# Patient Record
Sex: FEMALE | Race: WHITE | NOT HISPANIC OR LATINO | Employment: FULL TIME | URBAN - METROPOLITAN AREA
[De-identification: names, ages, dates, MRNs, and addresses within clinical notes are randomized per-mention and may not be internally consistent; named-entity substitution may affect disease eponyms.]

---

## 2017-01-13 ENCOUNTER — PATIENT MESSAGE (OUTPATIENT)
Dept: OBSTETRICS AND GYNECOLOGY | Facility: CLINIC | Age: 37
End: 2017-01-13

## 2017-01-13 RX ORDER — PROMETHAZINE HYDROCHLORIDE 25 MG/1
25 TABLET ORAL EVERY 6 HOURS PRN
Qty: 30 TABLET | Refills: 1 | Status: SHIPPED | OUTPATIENT
Start: 2017-01-13 | End: 2017-04-28

## 2017-02-02 ENCOUNTER — LAB VISIT (OUTPATIENT)
Dept: LAB | Facility: HOSPITAL | Age: 37
End: 2017-02-02
Attending: OBSTETRICS & GYNECOLOGY
Payer: COMMERCIAL

## 2017-02-02 ENCOUNTER — PROCEDURE VISIT (OUTPATIENT)
Dept: OBSTETRICS AND GYNECOLOGY | Facility: CLINIC | Age: 37
End: 2017-02-02
Payer: COMMERCIAL

## 2017-02-02 ENCOUNTER — INITIAL PRENATAL (OUTPATIENT)
Dept: OBSTETRICS AND GYNECOLOGY | Facility: CLINIC | Age: 37
End: 2017-02-02
Payer: COMMERCIAL

## 2017-02-02 ENCOUNTER — TELEPHONE (OUTPATIENT)
Dept: OBSTETRICS AND GYNECOLOGY | Facility: CLINIC | Age: 37
End: 2017-02-02

## 2017-02-02 VITALS
SYSTOLIC BLOOD PRESSURE: 122 MMHG | WEIGHT: 141.13 LBS | BODY MASS INDEX: 28.51 KG/M2 | DIASTOLIC BLOOD PRESSURE: 66 MMHG

## 2017-02-02 DIAGNOSIS — Z3A.11 11 WEEKS GESTATION OF PREGNANCY: ICD-10-CM

## 2017-02-02 DIAGNOSIS — Z3A.10 10 WEEKS GESTATION OF PREGNANCY: Primary | ICD-10-CM

## 2017-02-02 DIAGNOSIS — Z3A.10 10 WEEKS GESTATION OF PREGNANCY: ICD-10-CM

## 2017-02-02 DIAGNOSIS — Z3A.01 LESS THAN 8 WEEKS GESTATION OF PREGNANCY: ICD-10-CM

## 2017-02-02 LAB
ABO + RH BLD: NORMAL
BASOPHILS # BLD AUTO: 0.02 K/UL
BASOPHILS NFR BLD: 0.3 %
BLD GP AB SCN CELLS X3 SERPL QL: NORMAL
DIFFERENTIAL METHOD: NORMAL
EOSINOPHIL # BLD AUTO: 0.1 K/UL
EOSINOPHIL NFR BLD: 1 %
ERYTHROCYTE [DISTWIDTH] IN BLOOD BY AUTOMATED COUNT: 13.4 %
HCT VFR BLD AUTO: 37.4 %
HGB BLD-MCNC: 12.6 G/DL
LYMPHOCYTES # BLD AUTO: 1.8 K/UL
LYMPHOCYTES NFR BLD: 24.7 %
MCH RBC QN AUTO: 27.5 PG
MCHC RBC AUTO-ENTMCNC: 33.7 %
MCV RBC AUTO: 82 FL
MONOCYTES # BLD AUTO: 0.6 K/UL
MONOCYTES NFR BLD: 8.3 %
NEUTROPHILS # BLD AUTO: 4.7 K/UL
NEUTROPHILS NFR BLD: 65.4 %
PLATELET # BLD AUTO: 266 K/UL
PMV BLD AUTO: 10.4 FL
RBC # BLD AUTO: 4.59 M/UL
TSH SERPL DL<=0.005 MIU/L-ACNC: 1.36 UIU/ML
WBC # BLD AUTO: 7.22 K/UL

## 2017-02-02 PROCEDURE — 87086 URINE CULTURE/COLONY COUNT: CPT

## 2017-02-02 PROCEDURE — 86762 RUBELLA ANTIBODY: CPT

## 2017-02-02 PROCEDURE — 99999 PR PBB SHADOW E&M-EST. PATIENT-LVL II: CPT | Mod: PBBFAC,,, | Performed by: OBSTETRICS & GYNECOLOGY

## 2017-02-02 PROCEDURE — 84443 ASSAY THYROID STIM HORMONE: CPT

## 2017-02-02 PROCEDURE — 87340 HEPATITIS B SURFACE AG IA: CPT

## 2017-02-02 PROCEDURE — 76801 OB US < 14 WKS SINGLE FETUS: CPT | Mod: 26,S$GLB,, | Performed by: PEDIATRICS

## 2017-02-02 PROCEDURE — 90471 IMMUNIZATION ADMIN: CPT | Mod: S$GLB,,, | Performed by: OBSTETRICS & GYNECOLOGY

## 2017-02-02 PROCEDURE — 90686 IIV4 VACC NO PRSV 0.5 ML IM: CPT | Mod: S$GLB,,, | Performed by: OBSTETRICS & GYNECOLOGY

## 2017-02-02 PROCEDURE — 86850 RBC ANTIBODY SCREEN: CPT

## 2017-02-02 PROCEDURE — 0502F SUBSEQUENT PRENATAL CARE: CPT | Mod: S$GLB,,, | Performed by: OBSTETRICS & GYNECOLOGY

## 2017-02-02 PROCEDURE — 86592 SYPHILIS TEST NON-TREP QUAL: CPT

## 2017-02-02 PROCEDURE — 85025 COMPLETE CBC W/AUTO DIFF WBC: CPT

## 2017-02-02 PROCEDURE — 86703 HIV-1/HIV-2 1 RESULT ANTBDY: CPT

## 2017-02-02 PROCEDURE — 86900 BLOOD TYPING SEROLOGIC ABO: CPT

## 2017-02-02 PROCEDURE — 87591 N.GONORRHOEAE DNA AMP PROB: CPT

## 2017-02-02 NOTE — MR AVS SNAPSHOT
[facility]  [address]  [place] [address]  Phone: [phone]  Fax: [phone]                  Enedina Samano   2017 10:30 AM   Procedure visit    Description:  Female : 1980   Provider:  [facility]   Department:  [facility]           Diagnoses this Visit        Comments    Less than 8 weeks gestation of pregnancy                To Do List           Future Appointments        Provider Department Dept Phone    3/3/2017 8:45 AM Vandana Tabares MD [facility] [phone]    3/30/2017 8:45 AM Vandana Tabares MD [facility] [phone]      Goals (5 Years of Data)     None      [facility]     [facility] -  Assistance  Registered nurses in the [facility] provide clinical advisement, health education, appointment booking, and other advisory services.  Call for this free service at [phone].             Medications           Message regarding Medications     Verify the changes and/or additions to your medication regime listed below are the same as discussed with your clinician today.  If any of these changes or additions are incorrect, please notify your healthcare provider.             Verify that the below list of medications is an accurate representation of the medications you are currently taking.  If none reported, the list may be blank. If incorrect, please contact your healthcare provider. Carry this list with you in case of emergency.           Current Medications     citalopram (CELEXA) 20 MG tablet TK 1 T PO QD    esomeprazole (NEXIUM) 40 MG capsule Take 1 capsule (40 mg total) by mouth before breakfast. Annual appt must be made before additional refills are given.    promethazine (PHENERGAN) 25 MG tablet Take 1 tablet (25 mg total) by mouth every 6 (six) hours as needed for Nausea.           Clinical Reference Information           Prenatal  Vitals     Enc. Date GA Prenatal Vitals Prenatal Pulse Pain Level Urine Albumin/Glucose Edema Presentation Dilation/Effacement/Station    2/2/17 10w4d 122/66 / 64 kg (141 lb 2.2 oz) 10 cm / 151 / Absent   Negative / Negative         Your Vitals Were     Last Period                   11/20/2016           Allergies as of 2/2/2017     No Known Allergies      Immunizations Administered on Date of Encounter - 2/2/2017     Name Date Dose VIS Date Route    influenza - Quadrivalent - PF (ADULT) 2/2/2017 0.5 mL 8/7/2015 Intramuscular      Orders Placed During Today's Visit      Normal Orders This Visit    US OB/GYN Procedure (Viewpoint) - Extended List - Future       Language Assistance Services     ATTENTION: Language assistance services are available, free of charge. Please call [phone].      ATENCIÓN: Si farihala sultana, tiene a hui disposición servicios gratuitos de asistencia lingüística. Llame al [phone].     CHÚ Ý: N?u b?n nói Ti?ng Vi?t, có các d?ch v? h? tr? ngôn ng? mi?n phí dành cho b?n. G?i s? [phone].         [facility] complies with applicable Federal civil rights laws and does not discriminate on the basis of race, color, national origin, age, disability, or sex.

## 2017-02-02 NOTE — MR AVS SNAPSHOT
[facility]  [address]  [place] [address]  Phone: [phone]  Fax: [phone]                  Enedina Samano   2017 10:45 AM   Initial Prenatal    Description:  Female : 1980   Provider:  Vandana Tabares MD   Department:  [facility]           Reason for Visit     Initial Prenatal Visit           Diagnoses this Visit        Comments    10 weeks gestation of pregnancy    -  Primary     11 weeks gestation of pregnancy                To Do List           Future Appointments        Provider Department Dept Phone    3/3/2017 8:45 AM Vandana Tabares MD [facility] [phone]    3/30/2017 8:45 AM Vandana Tabares MD [facility] [phone]      Goals (5 Years of Data)     None      Follow-Up and Disposition     Return in about 1 month (around 3/2/2017).    Follow-up and Disposition History      [facility]     [facility] -  Assistance  Registered nurses in the [facility] On Call Center provide clinical advisement, health education, appointment booking, and other advisory services.  Call for this free service at [phone].             Medications           Message regarding Medications     Verify the changes and/or additions to your medication regime listed below are the same as discussed with your clinician today.  If any of these changes or additions are incorrect, please notify your healthcare provider.             Verify that the below list of medications is an accurate representation of the medications you are currently taking.  If none reported, the list may be blank. If incorrect, please contact your healthcare provider. Carry this list with you in case of emergency.           Current Medications     citalopram (CELEXA) 20 MG tablet TK 1 T PO QD    esomeprazole (NEXIUM) 40 MG capsule Take 1 capsule (40 mg total) by mouth before breakfast. Annual appt must be made before  additional refills are given.    promethazine (PHENERGAN) 25 MG tablet Take 1 tablet (25 mg total) by mouth every 6 (six) hours as needed for Nausea.           Clinical Reference Information           Prenatal Vitals     Enc. Date GA Prenatal Vitals Prenatal Pulse Pain Level Urine Albumin/Glucose Edema Presentation Dilation/Effacement/Station    2/2/17 10w4d 122/66 / 64 kg (141 lb 2.2 oz) 10 cm / 151 / Absent   Negative / Negative         Your Vitals Were     BP Weight Last Period BMI       122/66 64 kg (141 lb 2.2 oz) 11/20/2016 28.51 kg/m2       Allergies as of 2/2/2017     No Known Allergies      Immunizations Administered on Date of Encounter - 2/2/2017     Name Date Dose VIS Date Route    influenza - Quadrivalent - PF (ADULT) 2/2/2017 0.5 mL 8/7/2015 Intramuscular      Orders Placed During Today's Visit      Normal Orders This Visit    C. trachomatis/N. gonorrhoeae by AMP DNA Urine     Influenza - Quadrivalent (3 years & older) (PF)     Urine culture     Future Labs/Procedures Expected by Expires    CBC auto differential  2/2/2017 4/3/2018    Hepatitis B surface antigen  2/2/2017 4/3/2018    HIV-1 and HIV-2 antibodies  2/2/2017 4/3/2018    RPR  2/2/2017 4/3/2018    Rubella antibody, IgG  2/2/2017 4/3/2018    TSH  2/2/2017 4/3/2018    Type & Screen  2/2/2017 4/3/2018      Language Assistance Services     ATTENTION: Language assistance services are available, free of charge. Please call [phone].      ATENCIÓN: Si habla marcelaañol, tiene a hui disposición servicios gratuitos de asistencia lingüística. Llame al [phone].     [facility] Ý: N?u b?n nói Ti?ng Vi?t, có các d?ch v? h? tr? ngôn ng? mi?n phí dành cho b?n. G?i s? [phone].         [facility]'s [facility] complies with applicable Federal civil rights laws and does not discriminate on the basis of race, color, national origin, age, disability, or sex.

## 2017-02-02 NOTE — PROCEDURES
Procedures     History: Age: 36 years. : 2 Para: 1.   Obstetric History: Mode of last delivery:  Section @ 36 weeks.  (1).  ____________________________________________________________________________  Dating:  LMP: 16 EDC: 17 GA by LMP: 10w4d  Current Scan on: 17 EDC: 17 GA by current scan: 10w4d  The calculation of the gestational age by current scan was based on CRL.  ____________________________________________________________________________  First Trimester Scan:  Hull gestation.  Biometry:  CRL 36.5 mm 27th% 10w4d (10w1d to 11w0d)     Fetal heart activity: present. Fetal heart rate: 151 bpm.     Summary of Ultrasound Findings:  Transvaginal US. U/S machine: [id number]. U/S view: good.   Impression: normal intrauterine pregnancy.     ____________________________________________________________________________  Maternal Structures:  Cervix: Cervical length: 36 mm.  Right Ovary: normal.   Right Ovary size: 25 mm x 27 mm x 16 mm. Volume: 5.7 ml.   Left Ovary: normal.   Left Ovary size: 20 mm x 20 mm x 11 mm. Volume: 2.3 ml.  Cul de Sac / Pouch of Kalpesh: no free fluid visible.  ____________________________________________________________________________  Report Summary:  Impression:   Single viable intrauterine pregnancy consistent with LMP dating.  Embryo grossly WNL with normal cardiac activity.    Normal uterus, cervix and adnexae as noted above.    No fluid seen in cul-de-sac.     Recommendations: Suggest repeat scan as you feel clinically indicated.       Thanks once again for allowing us to participate in the care of your patients.  If you have any questions concerning today's consultation feel free to contact me or one of my partners.  We can be reached at [phone] during normal business hours.  If you have a question after normal business hours, please contact Labor and Delivery [phone] and the unit secretary will page our on call physician.

## 2017-02-02 NOTE — PROGRESS NOTES
See ultrasound. OB labs today. Some nausea despite Nexium and Tums, mostly at night. Wants [id number] next week; gave info. Flu shot today. Spotting/cramping precautions.

## 2017-02-03 LAB
BACTERIA UR CULT: NO GROWTH
C TRACH DNA SPEC QL NAA+PROBE: NEGATIVE
HBV SURFACE AG SERPL QL IA: NEGATIVE
HIV 1+2 AB+HIV1 P24 AG SERPL QL IA: NEGATIVE
N GONORRHOEA DNA SPEC QL NAA+PROBE: NEGATIVE
RPR SER QL: NORMAL
RUBV IGG SER-ACNC: 11.1 IU/ML
RUBV IGG SER-IMP: REACTIVE

## 2017-02-09 ENCOUNTER — PATIENT MESSAGE (OUTPATIENT)
Dept: OBSTETRICS AND GYNECOLOGY | Facility: CLINIC | Age: 37
End: 2017-02-09

## 2017-02-14 ENCOUNTER — PATIENT MESSAGE (OUTPATIENT)
Dept: OBSTETRICS AND GYNECOLOGY | Facility: CLINIC | Age: 37
End: 2017-02-14

## 2017-03-03 ENCOUNTER — ROUTINE PRENATAL (OUTPATIENT)
Dept: OBSTETRICS AND GYNECOLOGY | Facility: CLINIC | Age: 37
End: 2017-03-03
Payer: COMMERCIAL

## 2017-03-03 VITALS — WEIGHT: 141 LBS | BODY MASS INDEX: 28.48 KG/M2 | SYSTOLIC BLOOD PRESSURE: 124 MMHG | DIASTOLIC BLOOD PRESSURE: 66 MMHG

## 2017-03-03 DIAGNOSIS — Z34.80 SUPERVISION OF OTHER NORMAL PREGNANCY: Primary | ICD-10-CM

## 2017-03-03 DIAGNOSIS — Z3A.14 14 WEEKS GESTATION OF PREGNANCY: ICD-10-CM

## 2017-03-03 PROCEDURE — 99999 PR PBB SHADOW E&M-EST. PATIENT-LVL II: CPT | Mod: PBBFAC,,, | Performed by: OBSTETRICS & GYNECOLOGY

## 2017-03-03 PROCEDURE — 0502F SUBSEQUENT PRENATAL CARE: CPT | Mod: S$GLB,,, | Performed by: OBSTETRICS & GYNECOLOGY

## 2017-03-03 RX ORDER — BUTALBITAL, ACETAMINOPHEN AND CAFFEINE 50; 325; 40 MG/1; MG/1; MG/1
1 TABLET ORAL EVERY 4 HOURS PRN
Qty: 30 TABLET | Refills: 0 | Status: SHIPPED | OUTPATIENT
Start: 2017-03-03 | End: 2017-04-02

## 2017-03-03 NOTE — MR AVS SNAPSHOT
[facility]  [address] [facility]  [place] [address]  Phone: [phone]  Fax: [phone]                  Enedina Samano   3/3/2017 8:45 AM   Routine Prenatal    Description:  Female : 1980   Provider:  Vandana Tabares MD   Department:  [facility]           Reason for Visit     Routine Prenatal Visit           Diagnoses this Visit        Comments    Supervision of other normal pregnancy    -  Primary     14 weeks gestation of pregnancy                To Do List           Future Appointments        Provider Department Dept Phone    3/30/2017 8:45 AM Vandana Tabares MD [facility] [phone]    2017 8:45 AM Vandana Tabares MD [facility] [phone]      Goals (5 Years of Data)     None      Follow-Up and Disposition     Return in about 1 month (around 4/3/2017).    Follow-up and Disposition History       These Medications        Disp Refills Start End    butalbital-acetaminophen-caffeine -40 mg (FIORICET, ESGIC) -40 mg per tablet 30 tablet 0 3/3/2017 2017    Take 1 tablet by mouth every 4 (four) hours as needed for Pain. - Oral    Pharmacy: [facility] [address] [place] [address] [address] AT [facility] Ph #: [phone]         [facility] On Call     [facility] On Call Nurse Care Line -  Assistance  Registered nurses in the [facility] provide clinical advisement, health education, appointment booking, and other advisory services.  Call for this free service at [phone].             Medications           Message regarding Medications     Verify the changes and/or additions to your medication regime listed below are the same as discussed with your clinician today.  If any of these changes or additions are incorrect, please notify your healthcare provider.        START taking these NEW medications        Refills     butalbital-acetaminophen-caffeine -40 mg (FIORICET, ESGIC) -40 mg per tablet 0    Sig: Take 1 tablet by mouth every 4 (four) hours as needed for Pain.    Class: Normal    Route: Oral           Verify that the below list of medications is an accurate representation of the medications you are currently taking.  If none reported, the list may be blank. If incorrect, please contact your healthcare provider. Carry this list with you in case of emergency.           Current Medications     citalopram (CELEXA) 20 MG tablet TK 1 T PO QD    esomeprazole (NEXIUM) 40 MG capsule Take 1 capsule (40 mg total) by mouth before breakfast. Annual appt must be made before additional refills are given.    butalbital-acetaminophen-caffeine -40 mg (FIORICET, ESGIC) -40 mg per tablet Take 1 tablet by mouth every 4 (four) hours as needed for Pain.    promethazine (PHENERGAN) 25 MG tablet Take 1 tablet (25 mg total) by mouth every 6 (six) hours as needed for Nausea.           Clinical Reference Information           Prenatal Vitals     Enc. Date GA Prenatal Vitals Prenatal Pulse Pain Level Urine Albumin/Glucose Edema Presentation Dilation/Effacement/Station    3/3/17 14w5d 124/66 / 64 kg (140 lb 15.8 oz) 15 cm / 148 / Absent  0 Negative / Negative None / None / None      2/2/17 10w4d 122/66 / 64 kg (141 lb 2.2 oz) 10 cm / 151 / Absent   Negative / Negative          TWG: -0.05 kg (-1.8 oz)   Pregravid weight: 64 kg (141 lb 1.5 oz)       Your Vitals Were     BP Weight Last Period BMI       124/66 64 kg (140 lb 15.8 oz) 11/20/2016 28.48 kg/m2       Allergies as of 3/3/2017     No Known Allergies      Immunizations Administered on Date of Encounter - 3/3/2017     None      Language Assistance Services     ATTENTION: Language assistance services are available, free of charge. Please call [phone].      ATENCIÓN: Si habla español, tiene a hui disposición servicios gratuitos de asistencia lingüística. Llame al  [phone].     [facility]ES[facility] Ý: N?u b?n nói Ti?ng Vi?t, có các d?ch v? h? tr? ngôn ng? mi?n phí dành cho b?n. G?i s? [phone].         [facility]'s [facility] complies with applicable Federal civil rights laws and does not discriminate on the basis of race, color, national origin, age, disability, or sex.

## 2017-03-03 NOTE — PROGRESS NOTES
C/o sinus issues, takes Claritin daily: try sudafed, sent Rx for fioricet. Has ENT appt tomorrow.

## 2017-03-27 ENCOUNTER — PATIENT MESSAGE (OUTPATIENT)
Dept: OBSTETRICS AND GYNECOLOGY | Facility: CLINIC | Age: 37
End: 2017-03-27

## 2017-03-30 ENCOUNTER — LAB VISIT (OUTPATIENT)
Dept: LAB | Facility: HOSPITAL | Age: 37
End: 2017-03-30
Attending: OBSTETRICS & GYNECOLOGY
Payer: COMMERCIAL

## 2017-03-30 ENCOUNTER — ROUTINE PRENATAL (OUTPATIENT)
Dept: OBSTETRICS AND GYNECOLOGY | Facility: CLINIC | Age: 37
End: 2017-03-30
Payer: COMMERCIAL

## 2017-03-30 VITALS
SYSTOLIC BLOOD PRESSURE: 118 MMHG | BODY MASS INDEX: 28.85 KG/M2 | DIASTOLIC BLOOD PRESSURE: 74 MMHG | WEIGHT: 142.88 LBS

## 2017-03-30 DIAGNOSIS — Z34.80 SUPERVISION OF OTHER NORMAL PREGNANCY: Primary | ICD-10-CM

## 2017-03-30 DIAGNOSIS — Z3A.18 18 WEEKS GESTATION OF PREGNANCY: ICD-10-CM

## 2017-03-30 PROCEDURE — 0502F SUBSEQUENT PRENATAL CARE: CPT | Mod: S$GLB,,, | Performed by: OBSTETRICS & GYNECOLOGY

## 2017-03-30 PROCEDURE — 81511 FTL CGEN ABNOR FOUR ANAL: CPT

## 2017-03-30 PROCEDURE — 99999 PR PBB SHADOW E&M-EST. PATIENT-LVL II: CPT | Mod: PBBFAC,,, | Performed by: OBSTETRICS & GYNECOLOGY

## 2017-03-30 RX ORDER — LEVOCETIRIZINE DIHYDROCHLORIDE 5 MG/1
TABLET, FILM COATED ORAL
Refills: 6 | COMMUNITY
Start: 2017-03-13 | End: 2017-06-30

## 2017-03-30 RX ORDER — CEFDINIR 300 MG/1
CAPSULE ORAL
Refills: 0 | COMMUNITY
Start: 2017-03-13 | End: 2017-04-28

## 2017-03-30 NOTE — MR AVS SNAPSHOT
[facility]  [address]  [place] [address]  Phone: [phone]  Fax: [phone]                  Enedina Samano   3/30/2017 8:45 AM   Routine Prenatal    Description:  Female : 1980   Provider:  Vandana Tabares MD   Department:  [facility]           Reason for Visit     Routine Prenatal Visit           Diagnoses this Visit        Comments    Supervision of other normal pregnancy    -  Primary     18 weeks gestation of pregnancy                To Do List           Future Appointments        Provider Department Dept Phone    2017 8:45 AM Vandana Tabares MD [facility] [phone]      Goals (5 Years of Data)     None      Follow-Up and Disposition     Return in about 1 month (around 2017).    Follow-up and Disposition History      [facility]     [facility] On Call Nurse Care Line -  Assistance  Unless otherwise directed by your provider, please contact [facility], our nurse care line that is available for  assistance.     Registered nurses in the [facility] On Call Center provide: appointment scheduling, clinical advisement, health education, and other advisory services.  Call: [phone] (toll free)               Medications           Message regarding Medications     Verify the changes and/or additions to your medication regime listed below are the same as discussed with your clinician today.  If any of these changes or additions are incorrect, please notify your healthcare provider.             Verify that the below list of medications is an accurate representation of the medications you are currently taking.  If none reported, the list may be blank. If incorrect, please contact your healthcare provider. Carry this list with you in case of emergency.           Current Medications     butalbital-acetaminophen-caffeine -40 mg (FIORICET, ESGIC) -40 mg per tablet Take 1 tablet by mouth every 4  (four) hours as needed for Pain.    cefdinir (OMNICEF) 300 MG capsule TK ONE C PO  BID    citalopram (CELEXA) 20 MG tablet TK 1 T PO QD    esomeprazole (NEXIUM) 40 MG capsule Take 1 capsule (40 mg total) by mouth before breakfast. Annual appt must be made before additional refills are given.    levocetirizine (XYZAL) 5 MG tablet TK 1 T PO QAM    promethazine (PHENERGAN) 25 MG tablet Take 1 tablet (25 mg total) by mouth every 6 (six) hours as needed for Nausea.           Clinical Reference Information           Prenatal Vitals     Enc. Date GA Prenatal Vitals Prenatal Pulse Pain Level Urine Albumin/Glucose Edema Presentation Dilation/Effacement/Station    3/30/17 18w4d 118/74 / 64.8 kg (142 lb 13.7 oz) 18 cm / 145 / Absent  0 Negative / Negative None / None / None      3/3/17 14w5d 124/66 / 64 kg (140 lb 15.8 oz) 15 cm / 148 / Absent  0 Negative / Negative None / None / None      17 10w4d 122/66 / 64 kg (141 lb 2.2 oz) 10 cm / 151 / Absent   Negative / Negative          TW.8 kg (1 lb 12.2 oz)   Pregravid weight: 64 kg (141 lb 1.5 oz)       Your Vitals Were     BP Weight Last Period BMI       118/74 64.8 kg (142 lb 13.7 oz) 2016 28.85 kg/m2       Allergies as of 3/30/2017     No Known Allergies      Immunizations Administered on Date of Encounter - 3/30/2017     None      Orders Placed During Today's Visit     Future Labs/Procedures Expected by Expires    Maternal Quad Screen  3/30/2017 2018    [facility]  As directed 3/30/2018      Language Assistance Services     ATTENTION: Language assistance services are available, free of charge. Please call [phone].      ATENCIÓN: Si habla marcelaañol, tiene a hui disposición servicios gratuitos de asistencia lingüística. Llame al [phone].     CHÚ Ý: N?u b?n nói Ti?ng Vi?t, có các d?ch v? h? tr? ngôn ng? mi?n phí dành cho b?n. G?i s? [phone].         [facility] complies with applicable Federal civil rights  laws and does not discriminate on the basis of race, color, national origin, age, disability, or sex.

## 2017-04-03 LAB
ALPHA FETOPROTEIN MATERNAL: 74.6 NG/ML
DOWN RISK (<1:270): NORMAL
ETHNIC ORIGIN: NORMAL
GA METHOD: NORMAL
GESTATIONAL AGE (DAYS): 4
GESTATIONAL AGE (WEEKS): 18
HUMAN CHORIONIC GONADOTROPIN: 10 IU/ML
INHIBIN A: 139.9 PG/ML
INSULIN DEPEND. DIABETES: NORMAL
M.O.M. ALPHA FETOPROTEIN: 1.58
M.O.M. HCG: 0.44
M.O.M. INHIBIN A: 0.79
M.O.M. UNCONJ. ESTRIOL: 1.23
MATERNAL AGE AT EDD (YRS): 34
MATERNAL AGE FOR DOWN: NORMAL
MATERNAL WEIGHT (LBS): 143
MULTIPLE GESTATIONS: NORMAL
QUAD SCREEN INTERPRETATION: NORMAL
QUAD SCREEN: NEGATIVE
TRISOMY 18 (<1:100): NORMAL
UNCONJUGATED ESTRIOL: 1.71 NG/ML

## 2017-04-10 ENCOUNTER — OFFICE VISIT (OUTPATIENT)
Dept: MATERNAL FETAL MEDICINE | Facility: CLINIC | Age: 37
End: 2017-04-10
Attending: OBSTETRICS & GYNECOLOGY
Payer: COMMERCIAL

## 2017-04-10 DIAGNOSIS — Z36.89 ENCOUNTER FOR FETAL ANATOMIC SURVEY: ICD-10-CM

## 2017-04-10 DIAGNOSIS — Z34.80 SUPERVISION OF OTHER NORMAL PREGNANCY: ICD-10-CM

## 2017-04-10 DIAGNOSIS — Z3A.18 18 WEEKS GESTATION OF PREGNANCY: ICD-10-CM

## 2017-04-10 PROCEDURE — 76805 OB US >/= 14 WKS SNGL FETUS: CPT | Mod: S$GLB,,, | Performed by: OBSTETRICS & GYNECOLOGY

## 2017-04-10 PROCEDURE — 99499 UNLISTED E&M SERVICE: CPT | Mod: S$GLB,,, | Performed by: OBSTETRICS & GYNECOLOGY

## 2017-04-10 NOTE — LETTER
April 10, 2017      Vandana Tabares MD  [address] [facility]  [address]  [facility] [address]           [facility]  [address]  [facility] [address]  Phone: [phone]          Patient: Enedina Samano   MR Number: [id number]   YOB: 1980   Date of Visit: 4/10/2017       Dear Dr. Vandana Tabares:    Thank you for referring Enedina Samano to me for evaluation. Attached you will find relevant portions of my assessment and plan of care.    If you have questions, please do not hesitate to call me. I look forward to following Enedina Samano along with you.    Sincerely,    Néstor Shabazz MD    Enclosure  CC:  No Recipients    If you would like to receive this communication electronically, please contact [email or web address] or [phone] to request more information on [employer] Link access.    For providers and/or their staff who would like to refer a patient to [facility], please contact us through our one-stop-shop provider referral line, [facility], at [phone].    If you feel you have received this communication in error or would no longer like to receive these types of communications, please e-mail [email or web address]

## 2017-04-10 NOTE — PROGRESS NOTES
Indication  ========    Fetal anatomy survey.    Pregnancy History  ==============    Maternal Lab Tests  Test: Cell Free DNA Testing  Result: MaterniT 21 Negative  Test: Quad/ Penta Screen  Result: Negative  Wants to know gender: yes    Method  ======    Transabdominal ultrasound examination. View: Good view.    Pregnancy  =========    Hull pregnancy. Number of fetuses: 1.    Dating  ======    LMP on: 11/20/2016  Cycle: regular cycle  GA by LMP 20 w + 1 d  MARCIA by LMP: 8/27/2017  Ultrasound examination on: 4/10/2017  GA by U/S based upon: AC, BPD, Femur, HC  GA by U/S 20 w + 2 d  MARCIA by U/S: 8/26/2017  Assigned GA 20 w + 1 d  Assigned MARCIA: 8/27/2017    General Evaluation  ==============    Cardiac activity: present.  bpm.  Fetal movements: visualized.  Presentation: cephalic.  Placenta:  Placental site: posterior.  Umbilical cord: Cord vessels: 3 vessel cord.  Amniotic fluid: Amount of AF: normal amount.    Fetal Biometry  ============    Fetal Biometry  BPD 49.1 mm 78% 20w 6d Hadlock  OFD 61.4 mm 82% 21w 1d [place]  .7 mm 50% 20w 2d Hadlock  .3 mm 40% 20w 0d Hadlock  Femur 31.9 mm 34% 19w 6d Hadlock  Cerebellum tr 20.6 mm 58% 20w 3d [place]  CM 4.1 mm 20% Nicolaides  Nuchal fold 3.69 mm  Humerus 32.6 mm 81% 21w 0d [place]   g 39% 20w 0d Hadlock  Calculated by: Hadlock (BPD-HC-AC-FL)  EFW (lb) 0 lb  EFW (oz) 12 oz  Cephalic index 0.80 63% Nicolaides  HC / AC 1.21 82% Hadlock  FL / BPD 0.65 9% Hadlock  FL / AC 0.22 45% Hadlock   bpm  Head / Face / Neck   4.9 mm  Nasal bone 7.1 mm      Maternal Structures  ===============    Uterus / Cervix  Uterus: Normal  Cervical length 38.1 mm  Ovaries / Tubes / Adnexa  Rt ovary: Visualized  Lt ovary: Not visualized  Pouch of Kalpesh / Other Structures  Cul de Sac: Visualized  Free fluid: No free fluid visualized    Impression  =========    Normal fetal anatomy with no obvious abnormalities  Normal fetal growth  Normal amniotic fluid  volume  Normal placental location  Normal cervical length.    Recommendation  ==============    Repeat ultrasound study as clinically indicated.

## 2017-04-28 ENCOUNTER — ROUTINE PRENATAL (OUTPATIENT)
Dept: OBSTETRICS AND GYNECOLOGY | Facility: CLINIC | Age: 37
End: 2017-04-28
Payer: COMMERCIAL

## 2017-04-28 VITALS — DIASTOLIC BLOOD PRESSURE: 72 MMHG | WEIGHT: 145.5 LBS | SYSTOLIC BLOOD PRESSURE: 122 MMHG | BODY MASS INDEX: 29.39 KG/M2

## 2017-04-28 DIAGNOSIS — Z34.80 SUPERVISION OF OTHER NORMAL PREGNANCY: Primary | ICD-10-CM

## 2017-04-28 DIAGNOSIS — Z3A.22 22 WEEKS GESTATION OF PREGNANCY: ICD-10-CM

## 2017-04-28 PROCEDURE — 0502F SUBSEQUENT PRENATAL CARE: CPT | Mod: S$GLB,,, | Performed by: OBSTETRICS & GYNECOLOGY

## 2017-04-28 PROCEDURE — 99999 PR PBB SHADOW E&M-EST. PATIENT-LVL II: CPT | Mod: PBBFAC,,, | Performed by: OBSTETRICS & GYNECOLOGY

## 2017-04-28 NOTE — MR AVS SNAPSHOT
[facility]  [address]  [place] [address]  Phone: [phone]  Fax: [phone]                  Enedina Samano   2017 8:45 AM   Routine Prenatal    Description:  Female : 1980   Provider:  Vandana Tabares MD   Department:  [facility]           Reason for Visit     Routine Prenatal Visit           Diagnoses this Visit        Comments    Supervision of other normal pregnancy    -  Primary     22 weeks gestation of pregnancy                To Do List           Future Appointments        Provider Department Dept Phone    2017 11:15 AM Vandana Tabares MD [facility] [phone]    2017 9:15 AM Vandana Tabares MD [facility] [phone]    2017 9:15 AM Vandana Tabares MD [facility] [phone]    2017 9:15 AM Vandana Tabares MD [facility] [phone]    2017 9:15 AM Vandana Tabares MD [facility] [phone]      Goals (5 Years of Data)     None      Follow-Up and Disposition     Return in about 1 month (around 2017).    Follow-up and Disposition History      [facility] On Call     [facility] On Call Nurse Care Line -  Assistance  Unless otherwise directed by your provider, please contact [facility] On-Call, our nurse care line that is available for  assistance.     Registered nurses in the [facility] On Call Center provide: appointment scheduling, clinical advisement, health education, and other advisory services.  Call: [phone] (toll free)               Medications           Message regarding Medications     Verify the changes and/or additions to your medication regime listed below are the same as discussed with your clinician today.  If any of these changes or additions are incorrect, please notify your healthcare provider.        STOP taking these medications     cefdinir (OMNICEF) 300 MG capsule TK ONE C PO  BID     promethazine (PHENERGAN) 25 MG tablet Take 1 tablet (25 mg total) by mouth every 6 (six) hours as needed for Nausea.           Verify that the below list of medications is an accurate representation of the medications you are currently taking.  If none reported, the list may be blank. If incorrect, please contact your healthcare provider. Carry this list with you in case of emergency.           Current Medications     citalopram (CELEXA) 20 MG tablet TK 1 T PO QD    esomeprazole (NEXIUM) 40 MG capsule Take 1 capsule (40 mg total) by mouth before breakfast. Annual appt must be made before additional refills are given.    levocetirizine (XYZAL) 5 MG tablet TK 1 T PO QAM           Clinical Reference Information           Prenatal Vitals     Enc. Date GA Prenatal Vitals Prenatal Pulse Pain Level Urine Albumin/Glucose Edema Presentation Dilation/Effacement/Station    17 22w5d 122/72 / 66 kg (145 lb 8.1 oz) 22 cm / 143 / Present  0 Negative / Negative None / None / None / No      3/30/17 18w4d 118/74 / 64.8 kg (142 lb 13.7 oz) 18 cm / 145 / Absent  0 Negative / Negative None / None / None      3/3/17 14w5d 124/66 / 64 kg (140 lb 15.8 oz) 15 cm / 148 / Absent  0 Negative / Negative None / None / None      17 10w4d 122/66 / 64 kg (141 lb 2.2 oz) 10 cm / 151 / Absent   Negative / Negative          TW kg (4 lb 6.6 oz)   Pregravid weight: 64 kg (141 lb 1.5 oz)       Your Vitals Were     BP Weight Last Period BMI       122/72 66 kg (145 lb 8.1 oz) 2016 29.39 kg/m2       Allergies as of 2017     No Known Allergies      Immunizations Administered on Date of Encounter - 2017     None      Language Assistance Services     ATTENTION: Language assistance services are available, free of charge. Please call [phone].      ATENCIÓN: Si habla marcelaañol, tiene a hui disposición servicios gratuitos de asistencia lingüística. Llame al [phone].     CHÚ Ý: N?u b?n nói Ti?ng Vi?t, có các d?ch v? h? tr?  dre hernandez? mi?n phí dành cho b?n. G?i s? [phone].         [facility] complies with applicable Federal civil rights laws and does not discriminate on the basis of race, color, national origin, age, disability, or sex.

## 2017-05-08 ENCOUNTER — PATIENT MESSAGE (OUTPATIENT)
Dept: OBSTETRICS AND GYNECOLOGY | Facility: CLINIC | Age: 37
End: 2017-05-08

## 2017-05-11 ENCOUNTER — PATIENT MESSAGE (OUTPATIENT)
Dept: OBSTETRICS AND GYNECOLOGY | Facility: CLINIC | Age: 37
End: 2017-05-11

## 2017-05-25 ENCOUNTER — ROUTINE PRENATAL (OUTPATIENT)
Dept: OBSTETRICS AND GYNECOLOGY | Facility: CLINIC | Age: 37
End: 2017-05-25
Payer: COMMERCIAL

## 2017-05-25 VITALS
WEIGHT: 147.63 LBS | SYSTOLIC BLOOD PRESSURE: 108 MMHG | BODY MASS INDEX: 29.81 KG/M2 | DIASTOLIC BLOOD PRESSURE: 62 MMHG

## 2017-05-25 DIAGNOSIS — Z3A.26 26 WEEKS GESTATION OF PREGNANCY: Primary | ICD-10-CM

## 2017-05-25 DIAGNOSIS — Z34.80 SUPERVISION OF OTHER NORMAL PREGNANCY: ICD-10-CM

## 2017-05-25 PROCEDURE — 99999 PR PBB SHADOW E&M-EST. PATIENT-LVL II: CPT | Mod: PBBFAC,,, | Performed by: OBSTETRICS & GYNECOLOGY

## 2017-05-25 PROCEDURE — 0502F SUBSEQUENT PRENATAL CARE: CPT | Mod: S$GLB,,, | Performed by: OBSTETRICS & GYNECOLOGY

## 2017-05-25 RX ORDER — CYCLOBENZAPRINE HCL 10 MG
10 TABLET ORAL 3 TIMES DAILY PRN
Qty: 30 TABLET | Refills: 0 | Status: SHIPPED | OUTPATIENT
Start: 2017-05-25 | End: 2017-06-04

## 2017-05-25 NOTE — PROGRESS NOTES
C/o low back pain, took Flexeril with last pregnancy and did well; requesting Rx. 7 month labs and Tdap at next visit.

## 2017-06-09 ENCOUNTER — TELEPHONE (OUTPATIENT)
Dept: OBSTETRICS AND GYNECOLOGY | Facility: CLINIC | Age: 37
End: 2017-06-09

## 2017-06-09 ENCOUNTER — NURSE TRIAGE (OUTPATIENT)
Dept: ADMINISTRATIVE | Facility: CLINIC | Age: 37
End: 2017-06-09

## 2017-06-09 ENCOUNTER — HOSPITAL ENCOUNTER (EMERGENCY)
Facility: OTHER | Age: 37
Discharge: HOME | End: 2017-06-09
Attending: OBSTETRICS & GYNECOLOGY
Payer: COMMERCIAL

## 2017-06-09 VITALS
BODY MASS INDEX: 29.64 KG/M2 | TEMPERATURE: 98 F | HEIGHT: 59 IN | WEIGHT: 147 LBS | OXYGEN SATURATION: 99 % | DIASTOLIC BLOOD PRESSURE: 81 MMHG | RESPIRATION RATE: 18 BRPM | HEART RATE: 67 BPM | SYSTOLIC BLOOD PRESSURE: 122 MMHG

## 2017-06-09 DIAGNOSIS — O47.00 PRETERM UTERINE CONTRACTIONS: Primary | ICD-10-CM

## 2017-06-09 DIAGNOSIS — Z3A.28 28 WEEKS GESTATION OF PREGNANCY: ICD-10-CM

## 2017-06-09 LAB — FIBRONECTIN FETAL SPEC QL: NEGATIVE

## 2017-06-09 PROCEDURE — 99283 EMERGENCY DEPT VISIT LOW MDM: CPT | Mod: 25,,, | Performed by: OBSTETRICS & GYNECOLOGY

## 2017-06-09 PROCEDURE — 59025 FETAL NON-STRESS TEST: CPT | Mod: 26,,, | Performed by: OBSTETRICS & GYNECOLOGY

## 2017-06-09 PROCEDURE — 87081 CULTURE SCREEN ONLY: CPT

## 2017-06-09 PROCEDURE — 25000003 PHARM REV CODE 250: Performed by: OBSTETRICS & GYNECOLOGY

## 2017-06-09 PROCEDURE — 99284 EMERGENCY DEPT VISIT MOD MDM: CPT | Mod: 25

## 2017-06-09 PROCEDURE — 59025 FETAL NON-STRESS TEST: CPT

## 2017-06-09 PROCEDURE — 82731 ASSAY OF FETAL FIBRONECTIN: CPT

## 2017-06-09 RX ADMIN — SODIUM CHLORIDE, SODIUM LACTATE, POTASSIUM CHLORIDE, AND CALCIUM CHLORIDE 1000 ML: .6; .31; .03; .02 INJECTION, SOLUTION INTRAVENOUS at 06:06

## 2017-06-09 NOTE — ED NOTES
VSS. FHTs reassuring. Cervix closed. FFN negative. Urine dip WNL. Pt discharged as ordered.  labor precautions reviewed. Written instructions also provided. Pt instructed to keep appt with Dr. Tabares this morning. Pt ambulated off unit. No signs of distress.

## 2017-06-09 NOTE — TELEPHONE ENCOUNTER
28 5/7  Went to the ER last night with contractions every 5-10 minutes.  She was sent home with hydration instructions and told her this was her new baseline and if they get worse to come in.  She was not given any medication.   Contractions have since subsided.  She is asking when she should go? Recommended if she is concerned she can always go to the [facility] but if she is having more than 6 in an hour to go to the [facility] after drinking a couple bottles of water if they continue.

## 2017-06-09 NOTE — DISCHARGE INSTRUCTIONS
Call OB Clinic:    1. Baby not moving normally  2. Vaginal bleeding  3. Leakage of fluid like your water broke  4. Four or more contractions in one hour

## 2017-06-09 NOTE — ED PROVIDER NOTES
"Encounter Date: 2017       History     Chief Complaint   Patient presents with    Contractions     Review of patient's allergies indicates:  No Known Allergies  Enedina Samano is a 36 y.o. [id number] at 28w5d presents complaining of contractions occurring q5 minutes since 0130 this AM, she rates the contractions as a 5/10. Denies leakage of fluid, has not engaged in recent intercourse. Denies dysuria, or increase urinary frequency.  This IUP is complicated by history of  delivery at 36 weeks ( 2/2 aodil).  Patient denies vaginal bleeding, and LOF.   Fetal Movement: normal.            Past Medical History:   Diagnosis Date    Anxiety disorder     (Nos) started having panic attacks at end of 2012. became much worse in 2012    Depression     2012     Past Surgical History:   Procedure Laterality Date     SECTION       Family History   Problem Relation Age of Onset    Breast cancer Neg Hx     Colon cancer Neg Hx     Ovarian cancer Neg Hx     Diabetes Neg Hx     Hypertension Neg Hx      Social History   Substance Use Topics    Smoking status: Never Smoker    Smokeless tobacco: Not on file    Alcohol use No      Comment: socially     Review of Systems    Physical Exam     Initial Vitals   BP Pulse Resp Temp SpO2   -- -- -- -- --      /81   Pulse 67   Temp 97.5 °F (36.4 °C) (Temporal)   Resp 18   Ht 4' 11" (1.499 m)   Wt 66.7 kg (147 lb)   LMP 2016   SpO2 99%   Breastfeeding? No   BMI 29.69 kg/m²     Physical Exam    Nursing note and vitals reviewed.  Constitutional: She appears well-developed and well-nourished. She is not diaphoretic. No distress.   HENT:   Head: Normocephalic and atraumatic.   Eyes: Conjunctivae and EOM are normal.   Neck: Normal range of motion.   Cardiovascular: Normal rate.   Pulmonary/Chest: No respiratory distress.   Musculoskeletal: Normal range of motion.   Neurological: She is alert and oriented to person, place, and " time.   Skin: Skin is warm and dry.   Psychiatric: She has a normal mood and affect.     OB LABOR EXAM:       Method: Sterile vaginal exam per MD.   Vaginal Bleeding: none present.     Dilatation: 0.   Station: -3.       Comments: NST Interpretation:   130 BPM baseline  Variability: moderate  Accelerations: present  Decelerations: absent  Contractions: q2-3 minutes    Clinical Impression: Reactive Non-Stress Test         ED Course   Procedures  Labs Reviewed   STREP B SCREEN, VAGINAL / RECTAL   FETAL FIBRONECTIN             Medical Decision Making:   History:   Old Medical Records: I decided to obtain old medical records.  Old Records Summarized: records from clinic visits.  Initial Assessment:    labor  ED Management:  Cervical exam on initial check @0545, closed, 50%, soft  1L bolus given  FFN collected - negative, GBS collected  Has follow-up with Dr. Tabares this morning.               Attending Attestation:   Physician Attestation Statement for Resident:  As the supervising MD   Physician Attestation Statement: I have personally seen and examined this patient.   I agree with the above history. -:   As the supervising MD I agree with the above PE.    As the supervising MD I agree with the above treatment, course, plan, and disposition.   -:   NST  I independently reviewed the fetal non-stress test with the following interpretation:  130 BPM baseline  Variability: moderate  Accelerations: present  Decelerations: absent  Contractions: Q 10 min  Category 1    Clinical Interpretation: age appropriate    Patient evaluated and found to be stable, agree with resident's assessment and plan to discharge to home after contractions spaced out, no cervical change was seen. Patient has plan to follow up in the office this pm.  I was personally present during the critical portions of the procedure(s) performed by the resident and was immediately available in the ED to provide services and assistance as needed during  the entire procedure.                    ED Course     Clinical Impression:   The primary encounter diagnosis was  uterine contractions. A diagnosis of 28 weeks gestation of pregnancy was also pertinent to this visit.          Rissa Zee MD  Resident  [phone]       Shraddha Ventura MD  [phone]

## 2017-06-09 NOTE — TELEPHONE ENCOUNTER
"  Reason for Disposition   [1] Abdominal pain AND [2] pregnant > 20 weeks   [1] Having contractions or other symptoms of labor AND [2] < 37 weeks pregnant (i.e., )   Contractions < 10 minutes apart for 1 hour (i.e., 6 or more contractions an hour)    Answer Assessment - Initial Assessment Questions  1. LOCATION: "Where does it hurt?"       Back pain and lower pelvis  2. RADIATION: "Does the pain shoot anywhere else?" (e.g., chest, back)      As noted  3. ONSET: "When did the pain begin?" (Minutes, hours or days ago)       130am   4. ONSET: "Gradual or sudden onset?"      Woke up with cramp on left side  5. PATTERN: "Does the pain come and go, or has it been constant since it started?"       Every 5-10 minutes  6. SEVERITY: "How bad is the pain?" "What does it keep you from doing?"  (e.g., Scale 1-10; mild, moderate, or severe)    - MILD (1-3): doesn't interfere with normal activities, abdomen soft and not tender to touch     - MODERATE (4-7): interferes with normal activities or awakens from sleep, tender to touch     - SEVERE (8-10): excruciating pain, doubled over, unable to do any normal activities      5-6  7. RECURRENT SYMPTOM: "Have you ever had this type of abdominal pain before?" If so, ask: "When was the last time?" and "What happened that time?"         8. CAUSE: "What do you think is causing the abdominal pain?      contractions  9. RELIEVING/AGGRAVATING FACTORS: "What makes it better or worse?" (e.g., antacids, bowel movement, movement)        10. OTHER SYMPTOMS: "Has there been any vaginal bleeding, fever, vomiting, diarrhea, or urine problems?"          11. MARCIA: "What date are you expecting to deliver?"            Protocols used:  ABDOMINAL PAIN - FEMALE-A-,  PREGNANCY - ABDOMINAL PAIN GREATER THAN 20 WEEKS EGA-A-,  PREGNANCY - LABOR - [id number]    "

## 2017-06-12 LAB — BACTERIA SPEC AEROBE CULT: NORMAL

## 2017-06-18 ENCOUNTER — ANESTHESIA (OUTPATIENT)
Dept: OBSTETRICS AND GYNECOLOGY | Facility: OTHER | Age: 37
End: 2017-06-18
Payer: COMMERCIAL

## 2017-06-18 ENCOUNTER — ANESTHESIA EVENT (OUTPATIENT)
Dept: OBSTETRICS AND GYNECOLOGY | Facility: OTHER | Age: 37
End: 2017-06-18
Payer: COMMERCIAL

## 2017-06-18 ENCOUNTER — HOSPITAL ENCOUNTER (INPATIENT)
Facility: OTHER | Age: 37
LOS: 4 days | Discharge: HOME | End: 2017-06-22
Attending: OBSTETRICS & GYNECOLOGY | Admitting: OBSTETRICS & GYNECOLOGY
Payer: COMMERCIAL

## 2017-06-18 DIAGNOSIS — F41.9 ANXIETY: ICD-10-CM

## 2017-06-18 DIAGNOSIS — O46.93 VAGINAL BLEEDING IN PREGNANCY, THIRD TRIMESTER: ICD-10-CM

## 2017-06-18 DIAGNOSIS — Z3A.30 30 WEEKS GESTATION OF PREGNANCY: ICD-10-CM

## 2017-06-18 DIAGNOSIS — K21.00 GERD WITH ESOPHAGITIS: ICD-10-CM

## 2017-06-18 PROBLEM — O46.90 VAGINAL BLEEDING IN PREGNANCY: Status: ACTIVE | Noted: 2017-06-18

## 2017-06-18 LAB
ABO + RH BLD: NORMAL
ALLENS TEST: ABNORMAL
APTT BLDCRRT: 26.2 SEC
BASOPHILS # BLD AUTO: 0.01 K/UL
BASOPHILS NFR BLD: 0.1 %
BLD GP AB SCN CELLS X3 SERPL QL: NORMAL
CANDIDA RRNA VAG QL PROBE: NEGATIVE
DIFFERENTIAL METHOD: ABNORMAL
EOSINOPHIL # BLD AUTO: 0 K/UL
EOSINOPHIL NFR BLD: 0 %
ERYTHROCYTE [DISTWIDTH] IN BLOOD BY AUTOMATED COUNT: 13.5 %
FIBRINOGEN PPP-MCNC: 437 MG/DL
G VAGINALIS RRNA GENITAL QL PROBE: NEGATIVE
HCO3 UR-SCNC: 20.1 MMOL/L (ref 24–28)
HCT VFR BLD AUTO: 38.7 %
HGB BLD-MCNC: 13 G/DL
HIV1+2 IGG SERPL QL IA.RAPID: NEGATIVE
INR PPP: 0.8
LYMPHOCYTES # BLD AUTO: 1.2 K/UL
LYMPHOCYTES NFR BLD: 10 %
MCH RBC QN AUTO: 27.1 PG
MCHC RBC AUTO-ENTMCNC: 33.6 %
MCV RBC AUTO: 81 FL
MONOCYTES # BLD AUTO: 0.1 K/UL
MONOCYTES NFR BLD: 0.9 %
NEUTROPHILS # BLD AUTO: 10.7 K/UL
NEUTROPHILS NFR BLD: 88.8 %
PCO2 BLDA: 50.7 MMHG (ref 35–45)
PH SMN: 7.21 [PH] (ref 7.35–7.45)
PLATELET # BLD AUTO: 213 K/UL
PMV BLD AUTO: 10.4 FL
PO2 BLDA: 11 MMHG (ref 80–100)
POC BE: -8 MMOL/L
POC SATURATED O2: 8 % (ref 95–100)
PROTHROMBIN TIME: 9 SEC
RBC # BLD AUTO: 4.79 M/UL
SAMPLE: ABNORMAL
SITE: ABNORMAL
T VAGINALIS RRNA GENITAL QL PROBE: NEGATIVE
WBC # BLD AUTO: 12.06 K/UL

## 2017-06-18 PROCEDURE — 88302 TISSUE EXAM BY PATHOLOGIST: CPT | Mod: 26,,, | Performed by: PATHOLOGY

## 2017-06-18 PROCEDURE — 25000003 PHARM REV CODE 250: Performed by: OBSTETRICS & GYNECOLOGY

## 2017-06-18 PROCEDURE — 37000009 HC ANESTHESIA EA ADD 15 MINS: Performed by: OBSTETRICS & GYNECOLOGY

## 2017-06-18 PROCEDURE — 59025 FETAL NON-STRESS TEST: CPT | Mod: 26,,, | Performed by: OBSTETRICS & GYNECOLOGY

## 2017-06-18 PROCEDURE — 25000003 PHARM REV CODE 250: Performed by: ANESTHESIOLOGY

## 2017-06-18 PROCEDURE — 99285 EMERGENCY DEPT VISIT HI MDM: CPT | Mod: 25

## 2017-06-18 PROCEDURE — 59025 FETAL NON-STRESS TEST: CPT

## 2017-06-18 PROCEDURE — 88307 TISSUE EXAM BY PATHOLOGIST: CPT | Mod: 26,,, | Performed by: PATHOLOGY

## 2017-06-18 PROCEDURE — 85025 COMPLETE CBC W/AUTO DIFF WBC: CPT

## 2017-06-18 PROCEDURE — 86900 BLOOD TYPING SEROLOGIC ABO: CPT

## 2017-06-18 PROCEDURE — 99900035 HC TECH TIME PER 15 MIN (STAT)

## 2017-06-18 PROCEDURE — 51702 INSERT TEMP BLADDER CATH: CPT

## 2017-06-18 PROCEDURE — 63600175 PHARM REV CODE 636 W HCPCS

## 2017-06-18 PROCEDURE — 82803 BLOOD GASES ANY COMBINATION: CPT

## 2017-06-18 PROCEDURE — 76818 FETAL BIOPHYS PROFILE W/NST: CPT | Mod: 26,,, | Performed by: OBSTETRICS & GYNECOLOGY

## 2017-06-18 PROCEDURE — 25000003 PHARM REV CODE 250

## 2017-06-18 PROCEDURE — 85730 THROMBOPLASTIN TIME PARTIAL: CPT

## 2017-06-18 PROCEDURE — 87591 N.GONORRHOEAE DNA AMP PROB: CPT

## 2017-06-18 PROCEDURE — 37000008 HC ANESTHESIA 1ST 15 MINUTES: Performed by: OBSTETRICS & GYNECOLOGY

## 2017-06-18 PROCEDURE — 59514 CESAREAN DELIVERY ONLY: CPT | Mod: ,,, | Performed by: ANESTHESIOLOGY

## 2017-06-18 PROCEDURE — 63600175 PHARM REV CODE 636 W HCPCS: Performed by: OBSTETRICS & GYNECOLOGY

## 2017-06-18 PROCEDURE — 76815 OB US LIMITED FETUS(S): CPT

## 2017-06-18 PROCEDURE — 86850 RBC ANTIBODY SCREEN: CPT

## 2017-06-18 PROCEDURE — 86703 HIV-1/HIV-2 1 RESULT ANTBDY: CPT

## 2017-06-18 PROCEDURE — 36000679 HC C/S TUBAL LIGATION, UNSCHEDULED

## 2017-06-18 PROCEDURE — 87480 CANDIDA DNA DIR PROBE: CPT

## 2017-06-18 PROCEDURE — 85610 PROTHROMBIN TIME: CPT

## 2017-06-18 PROCEDURE — 99253 IP/OBS CNSLTJ NEW/EST LOW 45: CPT | Mod: 25,,, | Performed by: OBSTETRICS & GYNECOLOGY

## 2017-06-18 PROCEDURE — 63600175 PHARM REV CODE 636 W HCPCS: Performed by: ANESTHESIOLOGY

## 2017-06-18 PROCEDURE — 88307 TISSUE EXAM BY PATHOLOGIST: CPT | Performed by: PATHOLOGY

## 2017-06-18 PROCEDURE — 99223 1ST HOSP IP/OBS HIGH 75: CPT | Mod: 25,,, | Performed by: OBSTETRICS & GYNECOLOGY

## 2017-06-18 PROCEDURE — 27200918 HC ALEXIS O RING

## 2017-06-18 PROCEDURE — 25000003 PHARM REV CODE 250: Performed by: STUDENT IN AN ORGANIZED HEALTH CARE EDUCATION/TRAINING PROGRAM

## 2017-06-18 PROCEDURE — 85384 FIBRINOGEN ACTIVITY: CPT

## 2017-06-18 PROCEDURE — 36415 COLL VENOUS BLD VENIPUNCTURE: CPT

## 2017-06-18 PROCEDURE — 11000001 HC ACUTE MED/SURG PRIVATE ROOM

## 2017-06-18 PROCEDURE — 86920 COMPATIBILITY TEST SPIN: CPT

## 2017-06-18 PROCEDURE — 0UB70ZZ EXCISION OF BILATERAL FALLOPIAN TUBES, OPEN APPROACH: ICD-10-PCS | Performed by: OBSTETRICS & GYNECOLOGY

## 2017-06-18 PROCEDURE — 27200691 HC TRAY, EPIDURAL-SINGLE SHOT: Performed by: STUDENT IN AN ORGANIZED HEALTH CARE EDUCATION/TRAINING PROGRAM

## 2017-06-18 PROCEDURE — 63600175 PHARM REV CODE 636 W HCPCS: Performed by: STUDENT IN AN ORGANIZED HEALTH CARE EDUCATION/TRAINING PROGRAM

## 2017-06-18 RX ORDER — FAMOTIDINE 10 MG/ML
20 INJECTION INTRAVENOUS
Status: DISCONTINUED | OUTPATIENT
Start: 2017-06-18 | End: 2017-06-18

## 2017-06-18 RX ORDER — IBUPROFEN 400 MG/1
800 TABLET ORAL EVERY 8 HOURS
Status: DISCONTINUED | OUTPATIENT
Start: 2017-06-20 | End: 2017-06-18

## 2017-06-18 RX ORDER — BISACODYL 10 MG
10 SUPPOSITORY, RECTAL RECTAL ONCE AS NEEDED
Status: ACTIVE | OUTPATIENT
Start: 2017-06-18 | End: 2017-06-18

## 2017-06-18 RX ORDER — KETOROLAC TROMETHAMINE 30 MG/ML
INJECTION, SOLUTION INTRAMUSCULAR; INTRAVENOUS
Status: DISCONTINUED | OUTPATIENT
Start: 2017-06-18 | End: 2017-06-18

## 2017-06-18 RX ORDER — BETAMETHASONE SODIUM PHOSPHATE AND BETAMETHASONE ACETATE 3; 3 MG/ML; MG/ML
12 INJECTION, SUSPENSION INTRA-ARTICULAR; INTRALESIONAL; INTRAMUSCULAR; SOFT TISSUE ONCE
Status: DISCONTINUED | OUTPATIENT
Start: 2017-06-19 | End: 2017-06-18

## 2017-06-18 RX ORDER — BUPIVACAINE HYDROCHLORIDE 7.5 MG/ML
INJECTION, SOLUTION INTRASPINAL
Status: DISCONTINUED | OUTPATIENT
Start: 2017-06-18 | End: 2017-06-18

## 2017-06-18 RX ORDER — NIFEDIPINE 10 MG/1
10 CAPSULE ORAL EVERY 6 HOURS
Status: DISCONTINUED | OUTPATIENT
Start: 2017-06-18 | End: 2017-06-18

## 2017-06-18 RX ORDER — METHYLERGONOVINE MALEATE 0.2 MG/ML
INJECTION INTRAVENOUS
Status: DISCONTINUED
Start: 2017-06-18 | End: 2017-06-18 | Stop reason: WASHOUT

## 2017-06-18 RX ORDER — SIMETHICONE 80 MG
1 TABLET,CHEWABLE ORAL EVERY 6 HOURS PRN
Status: DISCONTINUED | OUTPATIENT
Start: 2017-06-18 | End: 2017-06-22 | Stop reason: HOSPADM

## 2017-06-18 RX ORDER — ONDANSETRON 2 MG/ML
4 INJECTION INTRAMUSCULAR; INTRAVENOUS EVERY 12 HOURS PRN
Status: DISCONTINUED | OUTPATIENT
Start: 2017-06-18 | End: 2017-06-22 | Stop reason: HOSPADM

## 2017-06-18 RX ORDER — MORPHINE SULFATE 0.5 MG/ML
INJECTION, SOLUTION EPIDURAL; INTRATHECAL; INTRAVENOUS
Status: DISCONTINUED | OUTPATIENT
Start: 2017-06-18 | End: 2017-06-18

## 2017-06-18 RX ORDER — DOCUSATE SODIUM 100 MG/1
200 CAPSULE, LIQUID FILLED ORAL 2 TIMES DAILY
Status: DISCONTINUED | OUTPATIENT
Start: 2017-06-18 | End: 2017-06-22 | Stop reason: HOSPADM

## 2017-06-18 RX ORDER — OXYCODONE AND ACETAMINOPHEN 5; 325 MG/1; MG/1
1 TABLET ORAL EVERY 4 HOURS PRN
Status: DISCONTINUED | OUTPATIENT
Start: 2017-06-18 | End: 2017-06-22 | Stop reason: HOSPADM

## 2017-06-18 RX ORDER — ONDANSETRON 8 MG/1
8 TABLET, ORALLY DISINTEGRATING ORAL EVERY 8 HOURS PRN
Status: DISCONTINUED | OUTPATIENT
Start: 2017-06-18 | End: 2017-06-18

## 2017-06-18 RX ORDER — CARBOPROST TROMETHAMINE 250 UG/ML
INJECTION, SOLUTION INTRAMUSCULAR
Status: DISCONTINUED
Start: 2017-06-18 | End: 2017-06-18 | Stop reason: WASHOUT

## 2017-06-18 RX ORDER — SODIUM CITRATE AND CITRIC ACID MONOHYDRATE 334; 500 MG/5ML; MG/5ML
30 SOLUTION ORAL
Status: DISCONTINUED | OUTPATIENT
Start: 2017-06-18 | End: 2017-06-18

## 2017-06-18 RX ORDER — AMOXICILLIN 250 MG
1 CAPSULE ORAL NIGHTLY PRN
Status: DISCONTINUED | OUTPATIENT
Start: 2017-06-18 | End: 2017-06-22 | Stop reason: HOSPADM

## 2017-06-18 RX ORDER — HYDROCORTISONE 25 MG/G
CREAM TOPICAL 3 TIMES DAILY PRN
Status: DISCONTINUED | OUTPATIENT
Start: 2017-06-18 | End: 2017-06-22 | Stop reason: HOSPADM

## 2017-06-18 RX ORDER — OXYTOCIN 10 [USP'U]/ML
INJECTION, SOLUTION INTRAMUSCULAR; INTRAVENOUS
Status: DISCONTINUED
Start: 2017-06-18 | End: 2017-06-18 | Stop reason: WASHOUT

## 2017-06-18 RX ORDER — HYDROCODONE BITARTRATE AND ACETAMINOPHEN 500; 5 MG/1; MG/1
TABLET ORAL
Status: DISCONTINUED | OUTPATIENT
Start: 2017-06-18 | End: 2017-06-22 | Stop reason: HOSPADM

## 2017-06-18 RX ORDER — MAGNESIUM SULFATE HEPTAHYDRATE 40 MG/ML
4 INJECTION, SOLUTION INTRAVENOUS ONCE
Status: COMPLETED | OUTPATIENT
Start: 2017-06-18 | End: 2017-06-18

## 2017-06-18 RX ORDER — CITALOPRAM 10 MG/1
20 TABLET ORAL DAILY
Status: DISCONTINUED | OUTPATIENT
Start: 2017-06-18 | End: 2017-06-22 | Stop reason: HOSPADM

## 2017-06-18 RX ORDER — KETOROLAC TROMETHAMINE 30 MG/ML
30 INJECTION, SOLUTION INTRAMUSCULAR; INTRAVENOUS EVERY 6 HOURS
Status: COMPLETED | OUTPATIENT
Start: 2017-06-18 | End: 2017-06-19

## 2017-06-18 RX ORDER — ACETAMINOPHEN 325 MG/1
650 TABLET ORAL EVERY 6 HOURS
Status: DISCONTINUED | OUTPATIENT
Start: 2017-06-18 | End: 2017-06-18

## 2017-06-18 RX ORDER — KETOROLAC TROMETHAMINE 30 MG/ML
30 INJECTION, SOLUTION INTRAMUSCULAR; INTRAVENOUS EVERY 6 HOURS
Status: DISCONTINUED | OUTPATIENT
Start: 2017-06-18 | End: 2017-06-18

## 2017-06-18 RX ORDER — FAMOTIDINE 10 MG/ML
INJECTION INTRAVENOUS
Status: COMPLETED
Start: 2017-06-18 | End: 2017-06-18

## 2017-06-18 RX ORDER — AMOXICILLIN 250 MG
1 CAPSULE ORAL NIGHTLY PRN
Status: DISCONTINUED | OUTPATIENT
Start: 2017-06-18 | End: 2017-06-18

## 2017-06-18 RX ORDER — ADHESIVE BANDAGE
30 BANDAGE TOPICAL 2 TIMES DAILY PRN
Status: DISCONTINUED | OUTPATIENT
Start: 2017-06-19 | End: 2017-06-22 | Stop reason: HOSPADM

## 2017-06-18 RX ORDER — ACETAMINOPHEN 325 MG/1
650 TABLET ORAL
Status: DISCONTINUED | OUTPATIENT
Start: 2017-06-18 | End: 2017-06-22 | Stop reason: HOSPADM

## 2017-06-18 RX ORDER — MAGNESIUM SULFATE HEPTAHYDRATE 40 MG/ML
2 INJECTION, SOLUTION INTRAVENOUS CONTINUOUS
Status: DISCONTINUED | OUTPATIENT
Start: 2017-06-18 | End: 2017-06-18

## 2017-06-18 RX ORDER — PROMETHAZINE HYDROCHLORIDE 25 MG/1
25 SUPPOSITORY RECTAL EVERY 6 HOURS PRN
Status: DISCONTINUED | OUTPATIENT
Start: 2017-06-18 | End: 2017-06-22 | Stop reason: HOSPADM

## 2017-06-18 RX ORDER — CALCIUM GLUCONATE 98 MG/ML
1 INJECTION, SOLUTION INTRAVENOUS
Status: DISCONTINUED | OUTPATIENT
Start: 2017-06-18 | End: 2017-06-18

## 2017-06-18 RX ORDER — FENTANYL CITRATE 50 UG/ML
INJECTION, SOLUTION INTRAMUSCULAR; INTRAVENOUS
Status: DISCONTINUED | OUTPATIENT
Start: 2017-06-18 | End: 2017-06-18

## 2017-06-18 RX ORDER — OXYCODONE HYDROCHLORIDE 5 MG/1
5 TABLET ORAL EVERY 4 HOURS PRN
Status: DISCONTINUED | OUTPATIENT
Start: 2017-06-18 | End: 2017-06-22 | Stop reason: HOSPADM

## 2017-06-18 RX ORDER — OXYTOCIN 10 [USP'U]/ML
INJECTION, SOLUTION INTRAMUSCULAR; INTRAVENOUS
Status: DISCONTINUED | OUTPATIENT
Start: 2017-06-18 | End: 2017-06-18

## 2017-06-18 RX ORDER — DIPHENHYDRAMINE HCL 25 MG
25 CAPSULE ORAL EVERY 4 HOURS PRN
Status: DISCONTINUED | OUTPATIENT
Start: 2017-06-18 | End: 2017-06-22 | Stop reason: HOSPADM

## 2017-06-18 RX ORDER — OXYCODONE AND ACETAMINOPHEN 10; 325 MG/1; MG/1
1 TABLET ORAL EVERY 4 HOURS PRN
Status: DISCONTINUED | OUTPATIENT
Start: 2017-06-18 | End: 2017-06-22 | Stop reason: HOSPADM

## 2017-06-18 RX ORDER — ACETAMINOPHEN 10 MG/ML
INJECTION, SOLUTION INTRAVENOUS
Status: DISCONTINUED | OUTPATIENT
Start: 2017-06-18 | End: 2017-06-18

## 2017-06-18 RX ORDER — OXYCODONE AND ACETAMINOPHEN 5; 325 MG/1; MG/1
1 TABLET ORAL EVERY 4 HOURS PRN
Status: DISCONTINUED | OUTPATIENT
Start: 2017-06-18 | End: 2017-06-18

## 2017-06-18 RX ORDER — MAGNESIUM SULFATE HEPTAHYDRATE 40 MG/ML
INJECTION, SOLUTION INTRAVENOUS
Status: COMPLETED
Start: 2017-06-18 | End: 2017-06-18

## 2017-06-18 RX ORDER — SIMETHICONE 80 MG
1 TABLET,CHEWABLE ORAL EVERY 6 HOURS PRN
Status: DISCONTINUED | OUTPATIENT
Start: 2017-06-18 | End: 2017-06-18

## 2017-06-18 RX ORDER — IBUPROFEN 400 MG/1
800 TABLET ORAL EVERY 8 HOURS
Status: DISCONTINUED | OUTPATIENT
Start: 2017-06-19 | End: 2017-06-22 | Stop reason: HOSPADM

## 2017-06-18 RX ORDER — ONDANSETRON 2 MG/ML
INJECTION INTRAMUSCULAR; INTRAVENOUS
Status: DISCONTINUED | OUTPATIENT
Start: 2017-06-18 | End: 2017-06-18

## 2017-06-18 RX ORDER — SODIUM CHLORIDE, SODIUM LACTATE, POTASSIUM CHLORIDE, CALCIUM CHLORIDE 600; 310; 30; 20 MG/100ML; MG/100ML; MG/100ML; MG/100ML
INJECTION, SOLUTION INTRAVENOUS CONTINUOUS
Status: DISCONTINUED | OUTPATIENT
Start: 2017-06-18 | End: 2017-06-18

## 2017-06-18 RX ORDER — KETOROLAC TROMETHAMINE 30 MG/ML
30 INJECTION, SOLUTION INTRAMUSCULAR; INTRAVENOUS EVERY 6 HOURS
Status: DISCONTINUED | OUTPATIENT
Start: 2017-06-19 | End: 2017-06-18

## 2017-06-18 RX ORDER — OXYCODONE HYDROCHLORIDE 5 MG/1
10 TABLET ORAL EVERY 4 HOURS PRN
Status: DISCONTINUED | OUTPATIENT
Start: 2017-06-18 | End: 2017-06-22 | Stop reason: HOSPADM

## 2017-06-18 RX ORDER — SODIUM CHLORIDE, SODIUM LACTATE, POTASSIUM CHLORIDE, CALCIUM CHLORIDE 600; 310; 30; 20 MG/100ML; MG/100ML; MG/100ML; MG/100ML
INJECTION, SOLUTION INTRAVENOUS CONTINUOUS
Status: ACTIVE | OUTPATIENT
Start: 2017-06-18 | End: 2017-06-19

## 2017-06-18 RX ORDER — IBUPROFEN 400 MG/1
800 TABLET ORAL EVERY 8 HOURS
Status: DISCONTINUED | OUTPATIENT
Start: 2017-06-19 | End: 2017-06-18

## 2017-06-18 RX ORDER — OXYTOCIN/RINGER'S LACTATE 20/1000 ML
41.65 PLASTIC BAG, INJECTION (ML) INTRAVENOUS CONTINUOUS
Status: ACTIVE | OUTPATIENT
Start: 2017-06-18 | End: 2017-06-19

## 2017-06-18 RX ORDER — PANTOPRAZOLE SODIUM 40 MG/1
40 TABLET, DELAYED RELEASE ORAL DAILY
Status: DISCONTINUED | OUTPATIENT
Start: 2017-06-18 | End: 2017-06-22 | Stop reason: HOSPADM

## 2017-06-18 RX ORDER — MAGNESIUM SULFATE HEPTAHYDRATE 40 MG/ML
2 INJECTION, SOLUTION INTRAVENOUS ONCE
Status: COMPLETED | OUTPATIENT
Start: 2017-06-18 | End: 2017-06-18

## 2017-06-18 RX ORDER — ONDANSETRON 8 MG/1
8 TABLET, ORALLY DISINTEGRATING ORAL EVERY 8 HOURS PRN
Status: DISCONTINUED | OUTPATIENT
Start: 2017-06-18 | End: 2017-06-22 | Stop reason: HOSPADM

## 2017-06-18 RX ORDER — MISOPROSTOL 200 UG/1
800 TABLET ORAL
Status: DISCONTINUED | OUTPATIENT
Start: 2017-06-18 | End: 2017-06-22 | Stop reason: HOSPADM

## 2017-06-18 RX ORDER — PRENATAL WITH FERROUS FUM AND FOLIC ACID 3080; 920; 120; 400; 22; 1.84; 3; 20; 10; 1; 12; 200; 27; 25; 2 [IU]/1; [IU]/1; MG/1; [IU]/1; MG/1; MG/1; MG/1; MG/1; MG/1; MG/1; UG/1; MG/1; MG/1; MG/1; MG/1
1 TABLET ORAL DAILY
Status: DISCONTINUED | OUTPATIENT
Start: 2017-06-18 | End: 2017-06-22 | Stop reason: HOSPADM

## 2017-06-18 RX ORDER — MISOPROSTOL 200 UG/1
TABLET ORAL
Status: DISCONTINUED
Start: 2017-06-18 | End: 2017-06-18 | Stop reason: WASHOUT

## 2017-06-18 RX ADMIN — DEXTROSE 2 G: 50 INJECTION, SOLUTION INTRAVENOUS at 03:06

## 2017-06-18 RX ADMIN — Medication 0.15 MG: at 03:06

## 2017-06-18 RX ADMIN — KETOROLAC TROMETHAMINE 30 MG: 30 INJECTION, SOLUTION INTRAMUSCULAR at 10:06

## 2017-06-18 RX ADMIN — MAGNESIUM SULFATE HEPTAHYDRATE 2 G: 40 INJECTION, SOLUTION INTRAVENOUS at 02:06

## 2017-06-18 RX ADMIN — OXYTOCIN 2 UNITS: 10 INJECTION, SOLUTION INTRAMUSCULAR; INTRAVENOUS at 04:06

## 2017-06-18 RX ADMIN — FAMOTIDINE 20 MG: 10 INJECTION, SOLUTION INTRAVENOUS at 03:06

## 2017-06-18 RX ADMIN — FAMOTIDINE 20 MG: 10 INJECTION INTRAVENOUS at 03:06

## 2017-06-18 RX ADMIN — FENTANYL CITRATE 10 MCG: 50 INJECTION, SOLUTION INTRAMUSCULAR; INTRAVENOUS at 03:06

## 2017-06-18 RX ADMIN — CITALOPRAM HYDROBROMIDE 20 MG: 20 TABLET ORAL at 06:06

## 2017-06-18 RX ADMIN — ACETAMINOPHEN 1000 MG: 10 INJECTION, SOLUTION INTRAVENOUS at 04:06

## 2017-06-18 RX ADMIN — MAGNESIUM SULFATE HEPTAHYDRATE 2 G/HR: 40 INJECTION, SOLUTION INTRAVENOUS at 02:06

## 2017-06-18 RX ADMIN — KETOROLAC TROMETHAMINE 30 MG: 30 INJECTION, SOLUTION INTRAMUSCULAR; INTRAVENOUS at 04:06

## 2017-06-18 RX ADMIN — BUPIVACAINE HYDROCHLORIDE IN DEXTROSE 12 MG: 7.5 INJECTION, SOLUTION SUBARACHNOID at 03:06

## 2017-06-18 RX ADMIN — ACETAMINOPHEN 650 MG: 325 TABLET ORAL at 10:06

## 2017-06-18 RX ADMIN — SODIUM CHLORIDE, SODIUM LACTATE, POTASSIUM CHLORIDE, AND CALCIUM CHLORIDE: .6; .31; .03; .02 INJECTION, SOLUTION INTRAVENOUS at 01:06

## 2017-06-18 RX ADMIN — ONDANSETRON 4 MG: 2 INJECTION INTRAMUSCULAR; INTRAVENOUS at 03:06

## 2017-06-18 RX ADMIN — SODIUM CITRATE AND CITRIC ACID MONOHYDRATE 30 ML: 500; 334 SOLUTION ORAL at 03:06

## 2017-06-18 RX ADMIN — Medication 41.65 MILLI-UNITS/MIN: at 05:06

## 2017-06-18 RX ADMIN — MAGNESIUM SULFATE HEPTAHYDRATE 2 G: 40 INJECTION, SOLUTION INTRAVENOUS at 01:06

## 2017-06-18 RX ADMIN — OXYCODONE HYDROCHLORIDE 10 MG: 5 TABLET ORAL at 06:06

## 2017-06-18 RX ADMIN — SODIUM CHLORIDE, SODIUM LACTATE, POTASSIUM CHLORIDE, AND CALCIUM CHLORIDE: .6; .31; .03; .02 INJECTION, SOLUTION INTRAVENOUS at 10:06

## 2017-06-18 RX ADMIN — DOCUSATE SODIUM 200 MG: 100 CAPSULE, LIQUID FILLED ORAL at 10:06

## 2017-06-18 NOTE — SUBJECTIVE & OBJECTIVE
Obstetric HPI:  Enedina Samano is a 36 y.o. [id number] at 30w0d presents as transfer from [place] for vaginal bleeding. Patient is seen by Dr. Tabares. She states she woke up this AM and noted bleeding in her under garments. She states she went to use the restroom and noted continued bleeding. She immediately went to the hospital in [place] for the bleeding who subsequently transferred her her. She states she has not been wearing pads, is unsure how much bleeding is occurring.  She denies passing clots. She reports the bleeding is bright red. Patient is not currently in pain. She denies recent intercourse or trauma. She states she was told she is андрей, but she has not been feeling contractions. She denies LOF, reports good FM.   Patient was recently seen in [place] for complaints of contractions, q2-3 minutes, FFN negative at this time, patient was discharged home.      Upon transfer, patient has received BMZ x1, 2g amp x1, and procardia 10 mg x1.      This IUP is complicated by anxiety and depression. She has h/o C/S x1 at 37-38 weeks due to arrest of dilation, per patient    Obstetric History       T0      L1     SAB0   TAB0   Ectopic0   Multiple0   Live Births1       # Outcome Date GA Lbr Jose/2nd Weight Sex Delivery Anes PTL Lv   2 Current            1  14 36w0d  2.126 kg (4 lb 11 oz) M CS-Unspec   VENKAT      Name: Max        Past Medical History:   Diagnosis Date    Anxiety disorder     (Nos) started having panic attacks at end of 2012. became much worse in 2012    Depression     2012     Past Surgical History:   Procedure Laterality Date     SECTION         PTA Medications   Medication Sig    citalopram (CELEXA) 20 MG tablet TK 1 T PO QD    esomeprazole (NEXIUM) 40 MG capsule Take 1 capsule (40 mg total) by mouth before breakfast. Annual appt must be made before additional refills are given.    levocetirizine (XYZAL) 5 MG tablet TK 1 T PO QAM        Review of patient's allergies indicates:  No Known Allergies     Family History     None        Social History Main Topics    Smoking status: Never Smoker    Smokeless tobacco: Not on file    Alcohol use No      Comment: socially    Drug use: No    Sexual activity: Yes     Partners: Male     Birth control/ protection: None     Review of Systems   Constitutional: Negative for activity change, appetite change, chills and fever.   Eyes: Negative for visual disturbance.   Respiratory: Negative for shortness of breath.    Cardiovascular: Negative for chest pain and leg swelling.   Gastrointestinal: Negative for abdominal pain, constipation, diarrhea, nausea and vomiting.   Genitourinary: Positive for vaginal bleeding. Negative for pelvic pain, vaginal discharge, vaginal pain and vaginal odor.   Neurological: Negative for seizures and headaches.      Objective:     Vital Signs (Most Recent):  Temp: 97.5 °F (36.4 °C) (06/18/17 1212)  Pulse: 72 (06/18/17 1212)  BP: 127/78 (06/18/17 1200)  SpO2: 100 % (06/18/17 1212) Vital Signs (24h Range):  Temp:  [97.5 °F (36.4 °C)] 97.5 °F (36.4 °C)  Pulse:  [72-75] 72  SpO2:  [100 %] 100 %  BP: (127)/(78) 127/78     Weight: 67.1 kg (148 lb)  Body mass index is 29.89 kg/m².    FHT: 135bpm, Mod BTBV, + accel, occasional late decel, Cat 2  TOCO: contractions Q 2-4 minutes    Physical Exam:   Constitutional: She is oriented to person, place, and time. She appears well-developed and well-nourished. No distress.    HENT:   Head: Normocephalic and atraumatic.    Eyes: Conjunctivae are normal.    Neck: Neck supple.    Cardiovascular: Regular rhythm.     Pulmonary/Chest: Effort normal. No respiratory distress.        Abdominal: Soft. She exhibits no distension. There is no tenderness. There is no rebound and no guarding.     Genitourinary: Pelvic exam was performed with patient supine. There is bleeding (dark red blood clot in vault, no active bleedingVaginal blood present extending  through cervix. Blood is dark and clotted, soaked 3 proctoswabs. No active bleeding when clots removed. No cervical ectropion ) in the vagina. Cervix exhibits no discharge and no friability.               Neurological: She is alert and oriented to person, place, and time.    Skin: Skin is warm and dry. No rash noted. She is not diaphoretic. No erythema.    Psychiatric: She has a normal mood and affect. Her behavior is normal. Judgment and thought content normal.       Cervix:  Dilation:  0  Effacement:  0%  Station: -3  Presentation: Breech     Significant Labs:  Lab Results   Component Value Date    GROUPTRH AB POS 02/02/2017    HEPBSAG Negative 02/02/2017    STREPBCULT No Group B Streptococcus isolated 06/09/2017       I have personallly reviewed all pertinent lab results from the last 24 hours.  Recent Lab Results       06/18/17  1509 06/18/17  1256      Baso # 0.01      Basophil% 0.1      Candida sp  Negative     Differential Method Automated      Eos # 0.0      Eosinophil% 0.0      Gardnerella vaginalis  Negative     Gran # 10.7(H)      Gran% 88.8(H)      Hematocrit 38.7      Hemoglobin 13.0      Lymph # 1.2      Lymph% 10.0(L)      MCH 27.1      MCHC 33.6      MCV 81(L)      Mono # 0.1(L)      Mono% 0.9(L)      MPV 10.4      Platelets 213      RBC 4.79      RDW 13.5      Trichomonas vaginalis  Negative     WBC 12.06

## 2017-06-18 NOTE — Clinical Note
I certify that Inpatient services for greater than or equal to 2 midnights are medically necessary:: Yes   Transfer To (Destination): [facility] ANTEPARTUM [[id number]]   Diagnosis: Vaginal bleeding in pregnancy, third trimester [825170]   Admitting Provider:: PAPA PRIEST [[id number]]   Future Attending Provider: JOSÉ MIGUEL DAVIS [[id number]]   Bed Type Preference: Standard [6]   Reason for IP Medical Treatment  (Clinical interventions that can only be accomplished in the IP setting? ) :: observation in setting of vaginal bleeding   Estimated Length of Stay:: 2 midnights   Plans for Post-Acute care--if anticipated (pick the single best option):: A. No post acute care anticipated at this time   Special Needs:: No Special Needs [1]

## 2017-06-18 NOTE — PROGRESS NOTES
Discussed pt after evaluation with Dr. Maravilla.  US concerning for abruption, 4/10 BPP, recommend movement towards CD.    Fetal status overall reassuring.   Will move towards delivery    Barb H Zeeshan, DO

## 2017-06-18 NOTE — TRANSFER OF CARE
"Anesthesia Transfer of Care Note    Patient: Enedina Samano    Procedure(s) Performed: Procedure(s) (LRB):  DELIVERY- SECTION WITH TUBAL (N/A)    Patient location: PACU    Anesthesia Type: spinal    Transport from OR: Transported from OR on room air with adequate spontaneous ventilation    Post pain: adequate analgesia    Post assessment: no apparent anesthetic complications and tolerated procedure well    Post vital signs: stable    Level of consciousness: awake and alert    Nausea/Vomiting: no nausea/vomiting    Complications: none          Last vitals:   Visit Vitals  /73   Pulse 91   Temp 36.2 °C (97.2 °F)   Resp 16   Ht 4' 11" (1.499 m)   Wt 67.1 kg (148 lb)   LMP 2016   SpO2 99%   Breastfeeding? No   BMI 29.89 kg/m²     "

## 2017-06-18 NOTE — ASSESSMENT & PLAN NOTE
- Patient received BMZ at OSH, continue Mag for fetal neuroprotection  - Do not recommend procardia in setting of vaginal bleeding  - No indication for PCN as patient is GBS negative    Dr. Maravilla to bedside for U/S  - 7 cm hyperechoic area, consistent with possible abruption, located behind posterior placenta  - BPP 4/10, now with late decelerations    Delivery is recommended at this time, discussed with Dr. Byers.

## 2017-06-18 NOTE — ED NOTES
Pt arrived to [facility] via EMS on stretcher, accompanied by family members. Placed in [facility] room 2, Dr.K. Pruitt notified. Urine sample collected. Towel changed under patient, previous towel showed dark red blood, spot was about the size of a tennis ball.

## 2017-06-18 NOTE — H&P
[facility]  Obstetrics  History & Physical    Patient Name: Enedina Samano  MRN: [id number]  Admission Date: 2017  Primary Care Provider: Tj Moss MD    Subjective:     Principal Problem: Vaginal bleeding in pregnancy    History of Present Illness:  Ms. Samano presents with vaginal bleeding.  She was transferred here from [place], where she was vacationing for the weekend. She woke up at 0630 this morning and found that she had bright red bleeding, she then went to the hospital in [place].  She was found to have  contractions while she was there, cervix closed, but she continued to have contractions and vaginal spotting.  She was then transferred here for MFM/admission.     She has had prenatal care with Dr. Tabares. Only complication is previous LTCD    Obstetric HPI:  Patient reports None contractions, active fetal movement, Yes vaginal bleeding , No loss of fluid     This pregnancy has been complicated by previous CD at 37 weeks due to failure to progress    Obstetric History       T0      L1     SAB0   TAB0   Ectopic0   Multiple0   Live Births1       # Outcome Date GA Lbr Jose/2nd Weight Sex Delivery Anes PTL Lv   2 Current            1  14 36w0d  2.126 kg (4 lb 11 oz) M CS-Unspec   VENKAT      Name: Max        Past Medical History:   Diagnosis Date    Anxiety disorder     (Nos) started having panic attacks at end of 2012. became much worse in 2012    Depression     2012     Past Surgical History:   Procedure Laterality Date     SECTION         PTA Medications   Medication Sig    citalopram (CELEXA) 20 MG tablet TK 1 T PO QD    esomeprazole (NEXIUM) 40 MG capsule Take 1 capsule (40 mg total) by mouth before breakfast. Annual appt must be made before additional refills are given.    levocetirizine (XYZAL) 5 MG tablet TK 1 T PO QAM       Review of patient's allergies indicates:  No Known Allergies     Family History      None        Social History Main Topics    Smoking status: Never Smoker    Smokeless tobacco: Not on file    Alcohol use No      Comment: socially    Drug use: No    Sexual activity: Yes     Partners: Male     Birth control/ protection: None     Review of Systems   Constitutional: Negative for activity change, appetite change, chills and fever.   Eyes: Negative for visual disturbance.   Respiratory: Negative for shortness of breath.    Cardiovascular: Negative for chest pain and leg swelling.   Gastrointestinal: Negative for abdominal pain, constipation, diarrhea, nausea and vomiting.   Genitourinary: Positive for vaginal bleeding. Negative for pelvic pain, vaginal discharge, vaginal pain and vaginal odor.   Neurological: Negative for seizures and headaches.      Objective:     Vital Signs (Most Recent):  Temp: 97.5 °F (36.4 °C) (06/18/17 1212)  Pulse: 72 (06/18/17 1212)  BP: 127/78 (06/18/17 1200)  SpO2: 100 % (06/18/17 1212) Vital Signs (24h Range):  Temp:  [97.5 °F (36.4 °C)] 97.5 °F (36.4 °C)  Pulse:  [72-75] 72  SpO2:  [100 %] 100 %  BP: (127)/(78) 127/78     Weight: 67.1 kg (148 lb)  Body mass index is 29.89 kg/m².    FHT: 135 Cat 2 (reassuring)  TOCO: contractions Q 2-4 minutes    Physical Exam:   Constitutional: She is oriented to person, place, and time. She appears well-developed and well-nourished. No distress.    HENT:   Head: Normocephalic and atraumatic.    Eyes: Conjunctivae are normal.    Neck: Neck supple.    Cardiovascular: Regular rhythm.     Pulmonary/Chest: Effort normal. No respiratory distress.        Abdominal: Soft. She exhibits no distension. There is no tenderness. There is no rebound and no guarding.     Genitourinary: Pelvic exam was performed with patient supine. There is bleeding (dark red blood clot in vault, no active bleeding) in the vagina. Cervix exhibits no discharge and no friability.               Neurological: She is alert and oriented to person, place, and time.     Skin: Skin is warm and dry. No rash noted. She is not diaphoretic. No erythema.    Psychiatric: She has a normal mood and affect. Her behavior is normal. Judgment and thought content normal.       Cervix:  Dilation:  0  Effacement:  0%  Station: -3  Presentation: Breech     Significant Labs:  Lab Results   Component Value Date    GROUPTRH AB POS 2017    HEPBSAG Negative 2017    STREPBCULT No Group B Streptococcus isolated 2017       I have personallly reviewed all pertinent lab results from the last 24 hours.  Recent Lab Results     None          Assessment/Plan:     36 y.o. female  at 30w0d for:    30 weeks gestation of pregnancy    Consents for vaginal delivery, C/S, and blood transfusion signed and on chart.  All questions and concerns addressed        GERD with esophagitis    Will restart nexium        Anxiety    Will restart celexa        Vaginal bleeding in pregnancy    Some concern of abruption due to vaginal bleeding and occasional late decel   Will keep her on continuous monitoring, NPO.  BMZ started this AM at 9am.  Will give mag for neuroprotection.   Hold tocolytic per Dr. Jenkins  GBS negative  NICU, MFM and Anesthesia consult ordered            Barb Byers DO  Obstetrics  [facility]

## 2017-06-18 NOTE — CONSULTS
[facility]  Obstetrics & Gynecology  Consult Note    Patient Name: Enedina Samano  MRN: [id number]  Admission Date: 2017  Hospital Length of Stay: 0 days  Attending Physician: Vandana Tabares MD  Primary Care Provider: Tj Moss MD    Inpatient consult to Perinatology  Consult performed by: CAROL PULIDO  Consult ordered by: PAPA PRIEST        [facility]  Obstetrics  Consult Note    Patient Name: Enedina Samano  MRN: [id number]  Admission Date: 2017  Hospital Length of Stay: 0 days  Code Status: Full Code  Primary Care Provider: Tj Moss MD  Principal Problem: <principal problem not specified>    [unfilled]  Subjective:     Principal Problem:<principal problem not specified>    History of Present Illness:  Ms. Samano presents with vaginal bleeding.  She was transferred here from [place], where she was vacationing for the weekend. She woke up at 0630 this morning and found that she had bright red bleeding, she then went to the hospital in [place].  She was found to have  contractions while she was there, cervix closed, but she continued to have contractions and vaginal spotting.  She was then transferred here for MFM/admission.     She has had prenatal care with Dr. Tabares. Only complication is previous LTCD    Obstetric HPI:  Enedina Samano is a 36 y.o. [id number] at 30w0d presents as transfer from [place] for vaginal bleeding. Patient is seen by Dr. Tabares. She states she woke up this AM and noted bleeding in her under garments. She states she went to use the restroom and noted continued bleeding. She immediately went to the hospital in [place] for the bleeding who subsequently transferred her her. She states she has not been wearing pads, is unsure how much bleeding is occurring.  She denies passing clots. She reports the bleeding is bright red. Patient is not currently in pain. She denies recent intercourse or  trauma. She states she was told she is андрей, but she has not been feeling contractions. She denies LOF, reports good FM.   Patient was recently seen in [place] for complaints of contractions, q2-3 minutes, FFN negative at this time, patient was discharged home.      Upon transfer, patient has received BMZ x1, 2g amp x1, and procardia 10 mg x1.      This IUP is complicated by anxiety and depression. She has h/o C/S x1 at 37-38 weeks due to arrest of dilation, per patient    Obstetric History       T0      L1     SAB0   TAB0   Ectopic0   Multiple0   Live Births1       # Outcome Date GA Lbr Jose/2nd Weight Sex Delivery Anes PTL Lv   2 Current            1  14 36w0d  2.126 kg (4 lb 11 oz) M CS-Unspec   VENKAT      Name: Everett        Past Medical History:   Diagnosis Date    Anxiety disorder     (Nos) started having panic attacks at end of 2012. became much worse in 2012    Depression     2012     Past Surgical History:   Procedure Laterality Date     SECTION         PTA Medications   Medication Sig    citalopram (CELEXA) 20 MG tablet TK 1 T PO QD    esomeprazole (NEXIUM) 40 MG capsule Take 1 capsule (40 mg total) by mouth before breakfast. Annual appt must be made before additional refills are given.    levocetirizine (XYZAL) 5 MG tablet TK 1 T PO QAM       Review of patient's allergies indicates:  No Known Allergies     Family History     None        Social History Main Topics    Smoking status: Never Smoker    Smokeless tobacco: Not on file    Alcohol use No      Comment: socially    Drug use: No    Sexual activity: Yes     Partners: Male     Birth control/ protection: None     Review of Systems   Constitutional: Negative for activity change, appetite change, chills and fever.   Eyes: Negative for visual disturbance.   Respiratory: Negative for shortness of breath.    Cardiovascular: Negative for chest pain and leg swelling.   Gastrointestinal: Negative  for abdominal pain, constipation, diarrhea, nausea and vomiting.   Genitourinary: Positive for vaginal bleeding. Negative for pelvic pain, vaginal discharge, vaginal pain and vaginal odor.   Neurological: Negative for seizures and headaches.      Objective:     Vital Signs (Most Recent):  Temp: 97.5 °F (36.4 °C) (06/18/17 1212)  Pulse: 72 (06/18/17 1212)  BP: 127/78 (06/18/17 1200)  SpO2: 100 % (06/18/17 1212) Vital Signs (24h Range):  Temp:  [97.5 °F (36.4 °C)] 97.5 °F (36.4 °C)  Pulse:  [72-75] 72  SpO2:  [100 %] 100 %  BP: (127)/(78) 127/78     Weight: 67.1 kg (148 lb)  Body mass index is 29.89 kg/m².    FHT: 135bpm, Mod BTBV, + accel, occasional late decel, Cat 2  TOCO: contractions Q 2-4 minutes    Physical Exam:   Constitutional: She is oriented to person, place, and time. She appears well-developed and well-nourished. No distress.    HENT:   Head: Normocephalic and atraumatic.    Eyes: Conjunctivae are normal.    Neck: Neck supple.    Cardiovascular: Regular rhythm.     Pulmonary/Chest: Effort normal. No respiratory distress.        Abdominal: Soft. She exhibits no distension. There is no tenderness. There is no rebound and no guarding.     Genitourinary: Pelvic exam was performed with patient supine. There is bleeding (dark red blood clot in vault, no active bleedingVaginal blood present extending through cervix. Blood is dark and clotted, soaked 3 proctoswabs. No active bleeding when clots removed. No cervical ectropion ) in the vagina. Cervix exhibits no discharge and no friability.               Neurological: She is alert and oriented to person, place, and time.    Skin: Skin is warm and dry. No rash noted. She is not diaphoretic. No erythema.    Psychiatric: She has a normal mood and affect. Her behavior is normal. Judgment and thought content normal.       Cervix:  Dilation:  0  Effacement:  0%  Station: -3  Presentation: Breech     Significant Labs:  Lab Results   Component Value Date    [facility]  POS 2017    HEPBSAG Negative 2017    STREPBCULT No Group B Streptococcus isolated 2017       I have personallly reviewed all pertinent lab results from the last 24 hours.  Recent Lab Results       17  1509 17  1256      Baso # 0.01      Basophil% 0.1      Candida sp  Negative     Differential Method Automated      Eos # 0.0      Eosinophil% 0.0      Gardnerella vaginalis  Negative     Gran # 10.7(H)      Gran% 88.8(H)      Hematocrit 38.7      Hemoglobin 13.0      Lymph # 1.2      Lymph% 10.0(L)      MCH 27.1      MCHC 33.6      MCV 81(L)      Mono # 0.1(L)      Mono% 0.9(L)      MPV 10.4      Platelets 213      RBC 4.79      RDW 13.5      Trichomonas vaginalis  Negative     WBC 12.06            Assessment/Plan:     36 y.o. female  at 30w0d for:    Vaginal bleeding in pregnancy    - Patient received BMZ at OSH, continue Mag for fetal neuroprotection  - Do not recommend procardia in setting of vaginal bleeding  - No indication for PCN as patient is GBS negative    Dr. Maravilla to bedside for U/S  - 7 cm hyperechoic area, consistent with possible abruption, located behind posterior placenta  - BPP 4/10, now with late decelerations    Delivery is recommended at this time, discussed with Dr. Byers.               Thank you for your consult.    Angie Pruitt MD  Obstetrics & Gynecology  [facility]      I have seen and examined the patient and agree with the resident note. Recommend delivery. Images reviewed with Dr. Byers at the bedside. Hard copyof images given to Dr. Byers for patient chart. Please also see my full progress note.

## 2017-06-18 NOTE — PROGRESS NOTES
Full consult note to follow.  FHT baseline from 140s to 150s moderate variability, one small period of reactivity with 10x10 accels, 4 late declerations, occ variable  Tocos q2-4min    Limited bedside ultrasound-large chorioamniotic separation at the right upper fundal area with the posterior/fundal placenta appearing to be pulled off the uterine wall, the fluid filled area has portions of mixed echogenicity, likely blood-this area is 7cm.    I compared this area to her prior ultrasounds in our office and this was not seen on prior scans. This is most consistent with a placental abruption particularly given the placental appearance.    BPP 4/10 (no 30 second breathing episode, off for movement and tone as well), 2 points for fluid and brief period of reactivity.    Given the BPP and concern for abruption and decelerations noted on the tracing, I recommend proceeding with csection at this time. I would not delay this. I would not attempt to get BMZ on board. Patient was started on magnesium sulfate for neuroprotection only; no tocolysis.    Labs are pending. Patient is Rh positive.    Discussed with patient and her family and Dr. Byers. We reviewed the limitations of ultrasound in diagnosing abruption. Regardless if an abruption is present or not, the BPP score is indicative of need for delivery and csection would be the recommended route.    Fetus is Breech. No previa noted. BP is normal.  Risks of prematurity reviewed. Risks of continued observation were reviewed.    Would attempt to obtain cord gas and send placenta to path.

## 2017-06-18 NOTE — ANESTHESIA PREPROCEDURE EVALUATION
Enedina Samano is a 36 y.o. female [id number] at 30w0d presents as transfer from [place] for vaginal bleeding and contractions. She has h/o C/S x1 at 37-38 weeks due to arrest of dilation, per patient. Denies problems with anesthesia for this C/S    OB History    Para Term  AB Living   2 1  1  1   SAB TAB Ectopic Multiple Live Births       1      # Outcome Date GA Lbr Jose/2nd Weight Sex Delivery Anes PTL Lv   2 Current            1  14 36w0d  2.126 kg (4 lb 11 oz) M CS-Unspec   VENKAT          Wt Readings from Last 1 Encounters:   17 0527 66.7 kg (147 lb)       BP Readings from Last 3 Encounters:   17 127/78   17 122/81   17 108/62       Patient Active Problem List   Diagnosis    Anxiety disorder    Depression    Vaginal bleeding in pregnancy       Past Surgical History:   Procedure Laterality Date     SECTION         Social History     Social History    Marital status:      Spouse name: N/A    Number of children: N/A    Years of education: N/A     Occupational History    Not on file.     Social History Main Topics    Smoking status: Never Smoker    Smokeless tobacco: Not on file    Alcohol use No      Comment: socially    Drug use: No    Sexual activity: Yes     Partners: Male     Birth control/ protection: None     Other Topics Concern    Not on file     Social History Narrative    No narrative on file         Chemistry        Component Value Date/Time     2016 1124    K 4.0 2016 1124     2016 1124    CO2 23 2016 1124    BUN 10 2016 1124    CREATININE 0.8 2016 1124    GLU 79 2016 1124        Component Value Date/Time    CALCIUM 8.7 2016 1124    ALKPHOS 69 2016 1124    AST 40 2016 1124    ALT 43 2016 1124    BILITOT 0.4 2016 1124            Lab Results   Component Value Date    WBC 7.22 2017    HGB 12.6 2017    HCT 37.4 2017    MCV 82  02/02/2017     02/02/2017       No results for input(s): INR, PROTIME, APTT in the last 72 hours.    Invalid input(s): PT                  Anesthesia Evaluation    I have reviewed the Patient Summary Reports.    I have reviewed the Nursing Notes.   I have reviewed the Medications.     Review of Systems  Anesthesia Hx:  No problems with previous Anesthesia  History of prior surgery of interest to airway management or planning: Denies Family Hx of Anesthesia complications.   Denies Personal Hx of Anesthesia complications.   Hematology/Oncology:  Hematology Normal   Oncology Normal     EENT/Dental:EENT/Dental Normal   Cardiovascular:  Cardiovascular Normal     Pulmonary:  Pulmonary Normal    Renal/:  Renal/ Normal     Hepatic/GI:  Hepatic/GI Normal    Neurological:  Neurology Normal        Physical Exam  General:  Well nourished    Airway/Jaw/Neck:  Airway Findings: Mouth Opening: Normal Tongue: Normal  General Airway Assessment: Adult  Mallampati: III  Improves to II with phonation.  TM Distance: Normal, at least 6 cm      Dental:  Dental Findings: In tact   Chest/Lungs:  Chest/Lungs Findings: Clear to auscultation, Normal Respiratory Rate     Heart/Vascular:  Heart Findings: Rate: Normal  Rhythm: Regular Rhythm  Sounds: Normal  Heart murmur: negative    Abdomen:  Abdomen Findings: Normal           Anesthesia Plan  Type of Anesthesia, risks & benefits discussed:  Anesthesia Type:  epidural, CSE, general, spinal  Patient's Preference:   Intra-op Monitoring Plan: standard ASA monitors  Intra-op Monitoring Plan Comments:   Post Op Pain Control Plan:   Post Op Pain Control Plan Comments:   Induction:   IV  Beta Blocker:  Patient is not currently on a Beta-Blocker (No further documentation required).       Informed Consent: Patient understands risks and agrees with Anesthesia plan.  Questions answered. Anesthesia consent signed with patient.  ASA Score: 2     Day of Surgery Review of History & Physical: I have  interviewed and examined the patient. I have reviewed the patient's H&P dated:            Ready For Surgery From Anesthesia Perspective.

## 2017-06-18 NOTE — NURSING
Mother encouraged to provide breastmilk for her NICU baby. Benefits of breastmilk discussed with mother. Mother declines to breastfeed at this time. Mother encouraged to inform nurse if she changes her mind.

## 2017-06-18 NOTE — ASSESSMENT & PLAN NOTE
Some concern of abruption due to vaginal bleeding and occasional late decel   Will keep her on continuous monitoring, NPO.  BMZ started this AM at 9am.  Will give mag for neuroprotection.   Hold tocolytic per Dr. Jenkins  GBS negative  NICU, MFM and Anesthesia consult ordered

## 2017-06-18 NOTE — HPI
Ms. Samano presents with vaginal bleeding.  She was transferred here from [place], where she was vacationing for the weekend. She woke up at 0630 this morning and found that she had bright red bleeding, she then went to the hospital in [place].  She was found to have  contractions while she was there, cervix closed, but she continued to have contractions and vaginal spotting.  She was then transferred here for MFM/admission.     She has had prenatal care with Dr. Tabares. Only complication is previous LTCD

## 2017-06-18 NOTE — ANESTHESIA PROCEDURE NOTES
Spinal    Diagnosis: IUP  Patient location during procedure: OR  Start time: 6/18/2017 3:43 PM  Timeout: 6/18/2017 3:42 PM  End time: 6/18/2017 3:45 PM  Staffing  Anesthesiologist: ROSEMARY RINCON  Resident/CRNA: STEVE HERBERT  Performed: resident/CRNA   Preanesthetic Checklist  Completed: patient identified, site marked, surgical consent, pre-op evaluation, timeout performed, IV checked, risks and benefits discussed and monitors and equipment checked  Spinal Block  Patient position: sitting  Prep: ChloraPrep  Patient monitoring: heart rate, cardiac monitor and continuous pulse ox  Approach: midline  Location: L3-4  Injection technique: single shot  CSF Fluid: clear free-flowing CSF  Needle  Needle type: pencil-tip   Needle gauge: 25 G  Needle length: 3.5 in  Additional Documentation: incremental injection  Needle localization: anatomical landmarks  Assessment  Sensory level: T5   Dermatomal levels determined by pinch or prick  Ease of block: easy  Patient's tolerance of the procedure: comfortable throughout block  Medications:  Bolus administered: 1.4 mL of 0.75 and with dextrose bupivacaine  Opioid administered: 160 mcg of   fentanyl and morphine

## 2017-06-18 NOTE — ASSESSMENT & PLAN NOTE
Consents for vaginal delivery, C/S, and blood transfusion signed and on chart.  All questions and concerns addressed

## 2017-06-18 NOTE — SUBJECTIVE & OBJECTIVE
Obstetric HPI:  Patient reports None contractions, active fetal movement, Yes vaginal bleeding , No loss of fluid     This pregnancy has been complicated by previous CD at 37 weeks due to failure to progress    Obstetric History       T0      L1     SAB0   TAB0   Ectopic0   Multiple0   Live Births1       # Outcome Date GA Lbr Jose/2nd Weight Sex Delivery Anes PTL Lv   2 Current            1  14 36w0d  2.126 kg (4 lb 11 oz) M CS-Unspec   VENKAT      Name: Max        Past Medical History:   Diagnosis Date    Anxiety disorder     (Nos) started having panic attacks at end of 2012. became much worse in 2012    Depression     2012     Past Surgical History:   Procedure Laterality Date     SECTION         PTA Medications   Medication Sig    citalopram (CELEXA) 20 MG tablet TK 1 T PO QD    esomeprazole (NEXIUM) 40 MG capsule Take 1 capsule (40 mg total) by mouth before breakfast. Annual appt must be made before additional refills are given.    levocetirizine (XYZAL) 5 MG tablet TK 1 T PO QAM       Review of patient's allergies indicates:  No Known Allergies     Family History     None        Social History Main Topics    Smoking status: Never Smoker    Smokeless tobacco: Not on file    Alcohol use No      Comment: socially    Drug use: No    Sexual activity: Yes     Partners: Male     Birth control/ protection: None     Review of Systems   Constitutional: Negative for activity change, appetite change, chills and fever.   Eyes: Negative for visual disturbance.   Respiratory: Negative for shortness of breath.    Cardiovascular: Negative for chest pain and leg swelling.   Gastrointestinal: Negative for abdominal pain, constipation, diarrhea, nausea and vomiting.   Genitourinary: Positive for vaginal bleeding. Negative for pelvic pain, vaginal discharge, vaginal pain and vaginal odor.   Neurological: Negative for seizures and headaches.      Objective:     Vital Signs  (Most Recent):  Temp: 97.5 °F (36.4 °C) (06/18/17 1212)  Pulse: 72 (06/18/17 1212)  BP: 127/78 (06/18/17 1200)  SpO2: 100 % (06/18/17 1212) Vital Signs (24h Range):  Temp:  [97.5 °F (36.4 °C)] 97.5 °F (36.4 °C)  Pulse:  [72-75] 72  SpO2:  [100 %] 100 %  BP: (127)/(78) 127/78     Weight: 67.1 kg (148 lb)  Body mass index is 29.89 kg/m².    FHT: 135 Cat 2 (reassuring)  TOCO: contractions Q 2-4 minutes    Physical Exam:   Constitutional: She is oriented to person, place, and time. She appears well-developed and well-nourished. No distress.    HENT:   Head: Normocephalic and atraumatic.    Eyes: Conjunctivae are normal.    Neck: Neck supple.    Cardiovascular: Regular rhythm.     Pulmonary/Chest: Effort normal. No respiratory distress.        Abdominal: Soft. She exhibits no distension. There is no tenderness. There is no rebound and no guarding.     Genitourinary: Pelvic exam was performed with patient supine. There is bleeding (dark red blood clot in vault, no active bleeding) in the vagina. Cervix exhibits no discharge and no friability.               Neurological: She is alert and oriented to person, place, and time.    Skin: Skin is warm and dry. No rash noted. She is not diaphoretic. No erythema.    Psychiatric: She has a normal mood and affect. Her behavior is normal. Judgment and thought content normal.       Cervix:  Dilation:  0  Effacement:  0%  Station: -3  Presentation: Breech     Significant Labs:  Lab Results   Component Value Date    GROUPTRH AB POS 02/02/2017    HEPBSAG Negative 02/02/2017    STREPBCULT No Group B Streptococcus isolated 06/09/2017       I have personallly reviewed all pertinent lab results from the last 24 hours.  Recent Lab Results     None

## 2017-06-18 NOTE — ED PROVIDER NOTES
Encounter Date: 2017       History     Chief Complaint   Patient presents with    Vaginal Bleeding    Contractions     Review of patient's allergies indicates:  No Known Allergies  Enedina Samano is a 36 y.o. [id number] at 30w0d presents as transfer from [place] for vaginal bleeding. Patient is seen by Dr. Tabares. She states she woke up this AM and noted bleeding in her under garments. She states she went to use the restroom and noted continued bleeding. She immediately went to the hospital in [place] for the bleeding who subsequently transferred her her. She states she has not been wearing pads, is unsure how much bleeding is occurring.  She denies passing clots. She reports the bleeding is bright red. Patient is not currently in pain. She denies recent intercourse or trauma. She states she was told she is андрей, but she has not been feeling contractions. She denies LOF, reports good FM.   Patient was recently seen in [place] for complaints of contractions, q2-3 minutes, FFN negative at this time, patient was discharged home.     Upon transfer, patient has received BMZ x1, 2g amp x1, and procardia 10 mg x1.     This IUP is complicated by anxiety and depression. She has h/o C/S x1 at 37-38 weeks due to arrest of dilation, per patient          Past Medical History:   Diagnosis Date    Anxiety disorder     (Nos) started having panic attacks at end of 2012. became much worse in 2012    Depression     2012     Past Surgical History:   Procedure Laterality Date     SECTION       Family History   Problem Relation Age of Onset    Breast cancer Neg Hx     Colon cancer Neg Hx     Ovarian cancer Neg Hx     Diabetes Neg Hx     Hypertension Neg Hx      Social History   Substance Use Topics    Smoking status: Never Smoker    Smokeless tobacco: Not on file    Alcohol use No      Comment: socially     Review of Systems   Constitutional: Negative for fever.   HENT: Negative for sore  throat.    Respiratory: Negative for shortness of breath.    Cardiovascular: Negative for chest pain.   Gastrointestinal: Negative for nausea.   Genitourinary: Positive for vaginal bleeding. Negative for dysuria.   Musculoskeletal: Negative for back pain.   Skin: Negative for rash.   Neurological: Negative for weakness.   Hematological: Does not bruise/bleed easily.       Physical Exam     Initial Vitals   BP Pulse Resp Temp SpO2   06/18/17 1200 06/18/17 1200 -- 06/18/17 1212 06/18/17 1212   127/78 75  97.5 °F (36.4 °C) 100 %     Physical Exam    Nursing note and vitals reviewed.  Constitutional: She appears well-developed and well-nourished. She is not diaphoretic. No distress.   HENT:   Head: Normocephalic and atraumatic.   Eyes: Conjunctivae and EOM are normal.   Neck: Normal range of motion.   Cardiovascular: Normal rate.   Pulmonary/Chest: No respiratory distress.   Abdominal: Soft. There is no tenderness. There is no rebound and no guarding.   Musculoskeletal: Normal range of motion.   Neurological: She is alert and oriented to person, place, and time.   Skin: Skin is warm and dry.   Psychiatric: She has a normal mood and affect. Her behavior is normal. Judgment and thought content normal.     OB LABOR EXAM:       Method: Sterile vaginal exam per MD and Sterile speculum exam per MD.   Vaginal Bleeding: other (see comments).     Dilatation: 0.   Station: -3.   Amniotic Fluid Color: no fluid.     Comments: NST Interpretation:   135 BPM baseline  Variability: moderate  Accelerations: present  Decelerations: absent  Contractions: q2-3 minutes, not painful    Clinical Impression: Reactive Non-Stress Test  Vaginal blood present extending through cervix. Blood is dark and clotted, soaked 3 proctoswabs. No active bleeding when clots removed. No cervical ectropion present. No CMT  Cervix 0/50/-3         ED Course   US - ED Performed - OB Limited 1 or More Gestations  Date/Time: 6/18/2017 1:04 PM  Performed by: ASHOK  CAROL ZARATE  Authorized by: BARB PRIEST   Comments: Breech        Labs Reviewed             Medical Decision Making:   Initial Assessment:   37 yo  with vaginal bleeding  Differential Diagnosis:   Cervical infection, trauma, placental abruption, placental previa  ED Management:  NST and exam reassuring   GC/CT and affirm collected  Will admit patient to antepartum with MFM consult  Management per primary  Other:   I have discussed this case with another health care provider.              Attending Attestation:   Physician Attestation Statement for Resident:  As the supervising MD   Physician Attestation Statement: I have personally seen and examined this patient.   I agree with the above history. -:   As the supervising MD I agree with the above PE.    As the supervising MD I agree with the above treatment, course, plan, and disposition.   -: I evaluated Ms. Samano with Dr. Pruitt.  There was a moderate amount of blood in the vault, fetal status overall reassuring.  Contractions every 3-5 minutes.  We will admit her for possible  labor - start steroids, BMZ, MFM consult.  Watch fetal status closely  I was personally present during the critical portions of the procedure(s) performed by the resident and was immediately available in the ED to provide services and assistance as needed during the entire procedure.  I have reviewed and agree with the residents interpretation of the following: lab data and rhythm strips.  I have reviewed the following: old records at this facility.                    ED Course     Clinical Impression:   The primary encounter diagnosis was Vaginal bleeding in pregnancy, third trimester. Diagnoses of 30 weeks gestation of pregnancy, Anxiety, and GERD with esophagitis were also pertinent to this visit.          Carol Pruitt MD  Resident  17 1300       Carol Pruitt MD  Resident  17 1304       Barb Priest,   17 [phone]

## 2017-06-18 NOTE — L&D DELIVERY NOTE
[facility]     Section     Operative Note      SUMMARY      Date of Procedure: 2017       Procedure: Procedure(s) (LRB):  DELIVERY- SECTION WITH TUBAL (N/A)      Surgeon(s) and Role:     * Barb Byers DO - Primary      Assisting Surgeon: Mai Washington MD      Pre-Operative Diagnosis: 30 weeks gestation of pregnancy [Z3A.30]  Vaginal bleeding in pregnancy, third trimester [O46.93]  Placental Abruption  Undesired Fertility      Post-Operative Diagnosis: Post-Op Diagnosis Codes:     * 30 weeks gestation of pregnancy [Z3A.30]     * Vaginal bleeding in pregnancy, third trimester [O46.93]  Placental Abruption  Undesired Fertility      Anesthesia: Spinal/Epidural      Technical Procedures Used: LTCD, BTL               Description of the Findings of the Procedure: viable  infant in breech presentation, bloody amniotic fluid         With patient in supine position, the legs are  and Kwong Catheter placed and positioning to supine done.     Abdomen prepped with Chloroprep and 3 minute drying time allowed prior to draping of the abdomen.     Time out taken with OR team members.    Pfannenstiel Incision made through the skin, transverse fascial incision developed, rectus muscles  in the midline and the peritoneum entered.     Minimal filmy  adhesions noted and taken down.  Avel retractor placed.     The lower uterine segment and position of the fetus identified.     Bladder flap taken down through transverse peritoneal incision.      Low Transverse Incision made through well developer lower uterine segment and extended laterally with blunt dissection.     Bloody fluid noted.  Infant delivered from footlingbreech presentation.  Cord clamped after one minute and  handed to attending nurse.    Cord blood taken, placenta delivered.    The uterus wasnot externalized.    The edges of the uterine incision are grasped with Lay clamps at the angles  and the inferior and superior midline edges of the incision.      Closure with running lock 0 Chromic, starting at each angle, tying in the midline.     Observation for bleeding with suture of any bleeding along the hysterotomy line.     With good hemostasis noted, the anterior pelvis is rinsed with 400 cc of sterile saline.     Right and left adnexa with normal anatomy.      Left fallopian tube was grasped with naseem clamp, a small incision made in mesosalpinx with the bovie.  Plain gut suture was used to tie off distal ends of incision and 1.5cm of tubal segment excised.  The same was started on right side, but bleeding noted from mesosalpinx, and tubal completed with wolfgang technique.  All tubal stubs were found to be hemostatic.     After hemostasis again noted at incision, sheryl retractor removed    Closure of the abdomen with 2 0 Vicryl running of the peritoneum , 0 Vicryl of the fascia starting at the right angle and tying at the left angle.    Skin closure with 4 0 Vicryl subcuticular.       Complications: No      Estimated Blood Loss (EBL): 125             Implants: * No implants in log *      Specimens: Specimen (12h ago through future)    Start     Ordered    17 1734  Specimen to Pathology - Surgery  Once     Comments:  [id number] 0daysSuspected placental abruption      17 1733    17 1734  Specimen to Pathology - Surgery  Once     Comments:  R and L fallopian tubes      17 1733          Condition: Good      Disposition: PACU - hemodynamically stable.    Barb Byers DO      Delivery Information for  Wesley Samano    Birth information:  YOB: 2015   Time of birth: 4:03 PM   Sex: male   Head Delivery Date/Time: 2017  4:03 PM   Delivery type: , Low Transverse   Gestational Age: 30w0d    Delivery Providers    Delivering clinician:  Barb Byers DO   Other personnel:   Provider Role   Mai Washington MD Resident   Cecile Raya,  BEAN Circrosendo Foy Surgical Tech                       Assessment    Living status:  Living   Apgars:      1 Minute:   5 Minute:   10 Minute:   15 Minute:   20 Minute:     Skin Color:   0  1       Heart Rate:   1  2       Reflex Irritability:   1  2       Muscle Tone:   1  1       Respiratory Effort:   1  1       Total:   4  7                  Apgars Assigned By:  NICU          Assisted Delivery Details:    Forceps attempted?:  No   Vacuum extractor attempted?:  No             Shoulder Dystocia    Shoulder dystocia present?:  No            Presentation and Position    Presentation:   Footling Breech   Position:                      Interventions/Resuscitation    Method:  NICU Attended        Cord    Vessels:  3 vessels   Complications:  None   Delayed Cord Clamping?:  No   Cord Clamped Date/Time:  2017  4:03 PM   Cord Blood Disposition:  Sent with Baby, Lab   Gases Sent?:  Yes   Stem Cell Collection (by MD):  No        Placenta    Date and time:  2017  4:04 PM   Removal:  Manual removal   Appearance:  Intact   Placenta disposition:  pathology            Labor Events:       labor: No     Labor Onset Date/Time:         Dilation Complete Date/Time:         Start Pushing Date/Time:       Rupture Date/Time:              Rupture type: intact         Fluid Amount:        Fluid Color:        Fluid Odor:        Membrane Status (PeriCalm): ARM (Artificial Rupture)      Rupture Date/Time (PeriCalm): 2017 16:02:00      Fluid Amount (PeriCalm):        Fluid Color (PeriCalm): Bloody       steroids: Partial Course     Antibiotics given for GBS: No     Induction:       Indications for induction:        Augmentation:       Indications for augmentation:       Labor complications: Abruptio Placenta     Additional complications:          Cervical ripening:                     Delivery:      Episiotomy: None     Indication for Episiotomy:       Perineal Lacerations: None Repaired:       Periurethral Laceration: none Repaired:     Labial Laceration: none Repaired:     Sulcus Laceration: none Repaired:     Vaginal Laceration: No Repaired:     Cervical Laceration: No Repaired:     Repair suture:       Repair # of packets: 10     Vaginal delivery QBL (mL):        QBL (mL): 0     Combined Blood Loss (mL): 0     Vaginal Sweep Performed: No     Surgicount Correct: Yes       Other providers:       Anesthesia    Method:  Spinal              Details (if applicable):  Trial of Labor No    Categorization: Repeat    Priority: Emergency   Indications for : Abruption;Repeat Section;Breech   Incision Type: Low Transverse     Additional  information:  Forceps:    Vacuum:    Breech:    Observed anomalies    Other (Comments):

## 2017-06-19 LAB
BASOPHILS # BLD AUTO: 0 K/UL
BASOPHILS NFR BLD: 0 %
C TRACH DNA SPEC QL NAA+PROBE: NOT DETECTED
DIFFERENTIAL METHOD: ABNORMAL
EOSINOPHIL # BLD AUTO: 0 K/UL
EOSINOPHIL NFR BLD: 0 %
ERYTHROCYTE [DISTWIDTH] IN BLOOD BY AUTOMATED COUNT: 13.6 %
HCT VFR BLD AUTO: 28.7 %
HGB BLD-MCNC: 9.7 G/DL
LYMPHOCYTES # BLD AUTO: 1.6 K/UL
LYMPHOCYTES NFR BLD: 11 %
MCH RBC QN AUTO: 27.4 PG
MCHC RBC AUTO-ENTMCNC: 33.8 %
MCV RBC AUTO: 81 FL
MONOCYTES # BLD AUTO: 0.8 K/UL
MONOCYTES NFR BLD: 5.3 %
N GONORRHOEA DNA SPEC QL NAA+PROBE: NOT DETECTED
NEUTROPHILS # BLD AUTO: 11.9 K/UL
NEUTROPHILS NFR BLD: 83.4 %
PLATELET # BLD AUTO: 182 K/UL
PMV BLD AUTO: 10.7 FL
RBC # BLD AUTO: 3.54 M/UL
WBC # BLD AUTO: 14.23 K/UL

## 2017-06-19 PROCEDURE — 85025 COMPLETE CBC W/AUTO DIFF WBC: CPT

## 2017-06-19 PROCEDURE — 11000001 HC ACUTE MED/SURG PRIVATE ROOM

## 2017-06-19 PROCEDURE — 25000003 PHARM REV CODE 250: Performed by: OBSTETRICS & GYNECOLOGY

## 2017-06-19 PROCEDURE — 63600175 PHARM REV CODE 636 W HCPCS: Performed by: ANESTHESIOLOGY

## 2017-06-19 PROCEDURE — 36415 COLL VENOUS BLD VENIPUNCTURE: CPT

## 2017-06-19 PROCEDURE — 25000003 PHARM REV CODE 250: Performed by: STUDENT IN AN ORGANIZED HEALTH CARE EDUCATION/TRAINING PROGRAM

## 2017-06-19 PROCEDURE — 25000003 PHARM REV CODE 250: Performed by: ANESTHESIOLOGY

## 2017-06-19 RX ADMIN — KETOROLAC TROMETHAMINE 30 MG: 30 INJECTION, SOLUTION INTRAMUSCULAR at 01:06

## 2017-06-19 RX ADMIN — Medication 1 EACH: at 09:06

## 2017-06-19 RX ADMIN — OXYCODONE HYDROCHLORIDE 10 MG: 5 TABLET ORAL at 10:06

## 2017-06-19 RX ADMIN — DOCUSATE SODIUM 200 MG: 100 CAPSULE, LIQUID FILLED ORAL at 09:06

## 2017-06-19 RX ADMIN — ACETAMINOPHEN 650 MG: 325 TABLET ORAL at 04:06

## 2017-06-19 RX ADMIN — OXYCODONE HYDROCHLORIDE 5 MG: 5 TABLET ORAL at 04:06

## 2017-06-19 RX ADMIN — PANTOPRAZOLE SODIUM 40 MG: 40 TABLET, DELAYED RELEASE ORAL at 09:06

## 2017-06-19 RX ADMIN — CITALOPRAM HYDROBROMIDE 20 MG: 20 TABLET ORAL at 01:06

## 2017-06-19 RX ADMIN — ACETAMINOPHEN 650 MG: 325 TABLET ORAL at 01:06

## 2017-06-19 RX ADMIN — KETOROLAC TROMETHAMINE 30 MG: 30 INJECTION, SOLUTION INTRAMUSCULAR at 04:06

## 2017-06-19 RX ADMIN — DOCUSATE SODIUM 200 MG: 100 CAPSULE, LIQUID FILLED ORAL at 10:06

## 2017-06-19 RX ADMIN — IBUPROFEN 800 MG: 400 TABLET, FILM COATED ORAL at 06:06

## 2017-06-19 NOTE — PHYSICIAN QUERY
"PT Name: Enedina Samano  MR #: [id number]    Physician Query Form - Hematology Clarification      CDS/: Enedina Matos RN-BSN      Contact information:[phone]    This form is a permanent document in the medical record.      Query Date: June 19, 2017    By submitting this query, we are merely seeking further clarification of documentation. Please utilize your independent clinical judgment when addressing the question(s) below.    The Medical record contains the following:   Indicators  Supporting Clinical Findings Location in Medical Record    "Anemia" documented     X H & H = Hgb=9.7-13.0  Hct=28.7-28.7 LAB 6/18-6/19    BP =                     HR=      "GI bleeding" documented     X Acute bleeding (Non GI site) Estimated Blood Loss (EBL): 125    She states she woke up this AM and noted bleeding in her under garments. She states she went to use the restroom and noted continued bleeding. She immediately went to the hospital in [place] for the bleeding who subsequently transferred her her. She states she has not been wearing pads, is unsure how much bleeding is occurring.  She denies passing clots. She reports the bleeding is bright red L&D Delivery note 6/18      ER note 6/18    Transfusion(s)      Treatment:     X Other:  Technical Procedures Used: LTCD, BTL L&D Delivery note 6/18     Provider, please specify diagnosis or diagnoses associated with above clinical findings.    [ X ] Acute blood loss anemia expected post-operatively  [  ] Acute blood loss anemia  [  ] Other Hematological Diagnosis (please specify): _________________________________  [  ] Clinically Undetermined       Please document in your progress notes daily for the duration of treatment, until resolved, and include in your discharge summary.                                                                                                      "

## 2017-06-19 NOTE — PROGRESS NOTES
Pt complained of facial flushing with ambulation. RN did vitals /80, temp 97.9, pulse 71. MD said for pt to rest. Will continue to monitor.

## 2017-06-19 NOTE — PROGRESS NOTES
POD #1    S: no complaints. Ambulating, voiding, tolerating po diet. Moderate lochia. Pain controlled with meds.    O:  Gen: AAO x 3, NAD  Vitals:    17 2015 17 2315 17 0446 17 0859   BP: 124/76 119/79 118/76 114/73   Pulse: 72 69 66 60   Resp:    Temp: 97.9 °F (36.6 °C) 97.6 °F (36.4 °C) 97.8 °F (36.6 °C) 97.6 °F (36.4 °C)   TempSrc: Oral Oral Oral Oral   SpO2: 98% 97% 98%    Weight:       Height:         Abdomen: soft, NT/ND  Uterus: firm, NT  Incision: clean, dry bandage in place  Ext: no CCE    H/H: 9.7/28.7  Blood type: AB pos  Rubella:      immune    A/P: POD #1, s/p  at 30 weeks for placental abruption, acute anemia as expected postop  - routine care

## 2017-06-19 NOTE — ANESTHESIA RELEASE NOTE
"Anesthesia Release from PACU Note    Patient: Enedina Samano    Procedure(s) Performed: Procedure(s) (LRB):  DELIVERY- SECTION WITH TUBAL (N/A)    Anesthesia type: spinal    Post pain: Adequate analgesia    Post assessment: no apparent anesthetic complications, tolerated procedure well and no evidence of recall    Last Vitals:   Visit Vitals  /80   Pulse 67   Temp 35.8 °C (96.4 °F) (Temporal)   Resp 17   Ht 4' 11" (1.499 m)   Wt 67.1 kg (148 lb)   LMP 2016   SpO2 100%   Breastfeeding? Unknown   BMI 29.89 kg/m²       Post vital signs: stable    Level of consciousness: awake, alert  and oriented    Nausea/Vomiting: no nausea/no vomiting    Complications: none    Airway Patency: patent    Respiratory: unassisted, spontaneous ventilation, room air    Cardiovascular: stable and blood pressure at baseline    Hydration: euvolemic  "

## 2017-06-19 NOTE — PLAN OF CARE
Problem: Breastfeeding (Adult,Obstetrics,Pediatric)  Goal: Signs and Symptoms of Listed Potential Problems Will be Absent, Minimized or Managed (Breastfeeding)  Signs and symptoms of listed potential problems will be absent, minimized or managed by discharge/transition of care (reference Breastfeeding (Adult,Obstetrics,Pediatric) CPG).  Outcome: Ongoing (interventions implemented as appropriate)   06/19/17 0900   Breastfeeding   Problems Assessed (Breastfeeding) all   Problems Present (Breastfeeding) other (see comments)   Lactation POC discussed with HE and pumping assistance provided. Pump 8 or more in 24, hand expression used, collection and storage instructions provided. Acknowledged understanding

## 2017-06-19 NOTE — PROGRESS NOTES
Ms. Samano doing well s/p RLTCD due to placental abruption at 30wga.   Baby doing well in NICU on PPV - she and dad have been down to visit baby  VSS  Urine output adequate  Bandage C/D/I  Will continue routine post-op care    Barb Byers DO

## 2017-06-19 NOTE — ANESTHESIA POSTPROCEDURE EVALUATION
"Anesthesia Post Evaluation    Patient: Enedina Samano    Procedure(s) Performed: Procedure(s) (LRB):  DELIVERY- SECTION WITH TUBAL (N/A)    Final Anesthesia Type: spinal  Patient location during evaluation: PACU  Patient participation: Yes- Able to Participate  Level of consciousness: awake and alert  Post-procedure vital signs: reviewed and stable  Pain management: adequate  Airway patency: patent  PONV status at discharge: No PONV  Anesthetic complications: no      Cardiovascular status: hemodynamically stable  Respiratory status: unassisted, spontaneous ventilation and room air  Hydration status: euvolemic  Follow-up not needed.        Visit Vitals  /80   Pulse 67   Temp 35.8 °C (96.4 °F) (Temporal)   Resp 17   Ht 4' 11" (1.499 m)   Wt 67.1 kg (148 lb)   LMP 2016   SpO2 100%   Breastfeeding? Unknown   BMI 29.89 kg/m²       Pain/Felice Score: Pain Rating Prior to Med Admin: 3 (2017  6:39 PM)      "

## 2017-06-19 NOTE — PROGRESS NOTES
Patient seen socially.  Reviewed ultrasound and events with her and her  with Dr. Tabares present.

## 2017-06-19 NOTE — LACTATION NOTE
06/19/17 0900   Maternal Infant Feeding   Maternal Preparation breast care   Maternal Emotional State relaxed   Time Spent (min) 15-30 min   Latch Assistance yes   Breastfeeding Education label/storage of breast milk;milk expression, electric pump;milk expression, hand   Equipment Type/Education   Breast Pumping Bilateral Breasts:;massage pre pumping;milk labeled/stored;pumped until soft   Lactation Interventions   Breastfeeding Assistance electric breast pump used;milk expression/pumping   Maternal Breastfeeding Support diary/feeding log utilized;lactation counseling provided   Lactation education provided with assistance with pumping and HE, FOB in attendance, assisting. Acknowledged understanding

## 2017-06-20 PROBLEM — O45.90 PLACENTA ABRUPTION, DELIVERED, CURRENT HOSPITALIZATION: Status: ACTIVE | Noted: 2017-06-20

## 2017-06-20 PROBLEM — O46.90 VAGINAL BLEEDING IN PREGNANCY: Status: RESOLVED | Noted: 2017-06-18 | Resolved: 2017-06-20

## 2017-06-20 PROBLEM — Z3A.30 30 WEEKS GESTATION OF PREGNANCY: Status: RESOLVED | Noted: 2017-06-18 | Resolved: 2017-06-20

## 2017-06-20 PROCEDURE — 25000003 PHARM REV CODE 250: Performed by: ANESTHESIOLOGY

## 2017-06-20 PROCEDURE — 11000001 HC ACUTE MED/SURG PRIVATE ROOM

## 2017-06-20 PROCEDURE — 25000003 PHARM REV CODE 250: Performed by: OBSTETRICS & GYNECOLOGY

## 2017-06-20 RX ADMIN — OXYCODONE HYDROCHLORIDE AND ACETAMINOPHEN 1 TABLET: 10; 325 TABLET ORAL at 09:06

## 2017-06-20 RX ADMIN — IBUPROFEN 800 MG: 400 TABLET, FILM COATED ORAL at 06:06

## 2017-06-20 RX ADMIN — Medication 1 EACH: at 08:06

## 2017-06-20 RX ADMIN — OXYCODONE HYDROCHLORIDE AND ACETAMINOPHEN 1 TABLET: 5; 325 TABLET ORAL at 02:06

## 2017-06-20 RX ADMIN — OXYCODONE HYDROCHLORIDE AND ACETAMINOPHEN 1 TABLET: 5; 325 TABLET ORAL at 10:06

## 2017-06-20 RX ADMIN — IBUPROFEN 800 MG: 400 TABLET, FILM COATED ORAL at 01:06

## 2017-06-20 RX ADMIN — DOCUSATE SODIUM 200 MG: 100 CAPSULE, LIQUID FILLED ORAL at 08:06

## 2017-06-20 RX ADMIN — CITALOPRAM HYDROBROMIDE 20 MG: 20 TABLET ORAL at 08:06

## 2017-06-20 RX ADMIN — IBUPROFEN 800 MG: 400 TABLET, FILM COATED ORAL at 10:06

## 2017-06-20 RX ADMIN — PANTOPRAZOLE SODIUM 40 MG: 40 TABLET, DELAYED RELEASE ORAL at 08:06

## 2017-06-20 NOTE — SUBJECTIVE & OBJECTIVE
Hospital course: POD#2: Enedina is in good post-op condition.  Pain controlled by medications, ambulating/eating/drinking without difficulty.  Lochia less than menses.  Baby boy in NICU doing well.      Patient is breastfeeding.     Objective:     Vital Signs (Most Recent):  Temp: 98.8 °F (37.1 °C) (06/19/17 2309)  Pulse: 65 (06/19/17 2309)  Resp: 18 (06/19/17 2309)  BP: 128/79 (06/19/17 2309)  SpO2: 100 % (06/19/17 2309) Vital Signs (24h Range):  Temp:  [97.9 °F (36.6 °C)-98.8 °F (37.1 °C)] 98.8 °F (37.1 °C)  Pulse:  [65-71] 65  Resp:  [18] 18  SpO2:  [100 %] 100 %  BP: (127-132)/(79-85) 128/79     Weight: 67.1 kg (148 lb)  Body mass index is 29.89 kg/m².      Intake/Output Summary (Last 24 hours) at 06/20/17 1058  Last data filed at 06/19/17 2300   Gross per 24 hour   Intake              500 ml   Output              300 ml   Net              200 ml       Significant Labs:  Lab Results   Component Value Date    GROUPTRH AB POS 06/18/2017    HEPBSAG Negative 02/02/2017    STREPBCULT No Group B Streptococcus isolated 06/09/2017       Recent Labs  Lab 06/19/17  0528   HGB 9.7*   HCT 28.7*       I have personallly reviewed all pertinent lab results from the last 24 hours.  Recent Lab Results     None          Physical Exam:   Constitutional: She appears well-developed and well-nourished. No distress.    HENT:   Head: Normocephalic and atraumatic.     Neck: Neck supple.     Pulmonary/Chest: Effort normal. No respiratory distress.        Abdominal: Soft. She exhibits abdominal incision (intact without sign of infection). She exhibits no distension. There is no tenderness. There is no rebound and no guarding.   Fundus firm, NT, below umbilicus             Musculoskeletal: She exhibits edema.       Neurological: She is alert.    Skin: Skin is warm and dry. She is not diaphoretic.    Psychiatric: She has a normal mood and affect.

## 2017-06-20 NOTE — HOSPITAL COURSE
POD#2: Enedina is in good post-op condition.  Pain controlled by medications, ambulating/eating/drinking without difficulty.  Lochia less than menses.  Baby boy in NICU doing well.      Postop Day #4 doing well and ready to go home, desires refill of Celexa

## 2017-06-20 NOTE — LACTATION NOTE
Reviewed pumping with NICU mother and reviewed cleaned/sterilization. Aware of how to label breastmilk. Mother plans to rent.

## 2017-06-20 NOTE — PROGRESS NOTES
[facility]  Obstetrics  Postpartum Progress Note    Patient Name: Enedina Samano  MRN: [id number]  Admission Date: 2017  Hospital Length of Stay: 2 days  Attending Physician: Vandana Tabares MD  Primary Care Provider: Tj Moss MD    Subjective:     Principal Problem: Placental abruption s/p     Hospital course: POD#2: Enedina is in good post-op condition.  Pain controlled by medications, ambulating/eating/drinking without difficulty.  Lochia less than menses.  Baby boy in NICU doing well.      Patient is breastfeeding.     Objective:     Vital Signs (Most Recent):  Temp: 98.8 °F (37.1 °C) (17)  Pulse: 65 (17)  Resp: 18 (17)  BP: 128/79 (17)  SpO2: 100 % (17) Vital Signs (24h Range):  Temp:  [97.9 °F (36.6 °C)-98.8 °F (37.1 °C)] 98.8 °F (37.1 °C)  Pulse:  [65-71] 65  Resp:  [18] 18  SpO2:  [100 %] 100 %  BP: (127-132)/(79-85) 128/79     Weight: 67.1 kg (148 lb)  Body mass index is 29.89 kg/m².      Intake/Output Summary (Last 24 hours) at 17 1058  Last data filed at 17 2300   Gross per 24 hour   Intake              500 ml   Output              300 ml   Net              200 ml       Significant Labs:  Lab Results   Component Value Date    GROUPTRH AB POS 2017    HEPBSAG Negative 2017    STREPBCULT No Group B Streptococcus isolated 2017       Recent Labs  Lab 17  0528   HGB 9.7*   HCT 28.7*       I have personallly reviewed all pertinent lab results from the last 24 hours.  Recent Lab Results     None          Physical Exam:   Constitutional: She appears well-developed and well-nourished. No distress.    HENT:   Head: Normocephalic and atraumatic.     Neck: Neck supple.     Pulmonary/Chest: Effort normal. No respiratory distress.        Abdominal: Soft. She exhibits abdominal incision (intact without sign of infection). She exhibits no distension. There is no tenderness. There is  no rebound and no guarding.   Fundus firm, NT, below umbilicus             Musculoskeletal: She exhibits edema.       Neurological: She is alert.    Skin: Skin is warm and dry. She is not diaphoretic.    Psychiatric: She has a normal mood and affect.       Assessment/Plan:     36 y.o. female  for:    Placenta abruption, delivered, current hospitalization    Baby stable in NICU        Maternal anemia with delivery, with current postpartum complication    Asymptomatic, no need for further treatment at this time         delivery, delivered, current hospitalization    Good post-op condition        GERD with esophagitis    Stable on current medications        Anxiety    Stable on current medications            Disposition: As patient meets milestones, will plan to discharge POD 4.    Barb Byers, DO  Obstetrics  [facility]

## 2017-06-21 PROCEDURE — 25000003 PHARM REV CODE 250: Performed by: OBSTETRICS & GYNECOLOGY

## 2017-06-21 PROCEDURE — 11000001 HC ACUTE MED/SURG PRIVATE ROOM

## 2017-06-21 PROCEDURE — 25000003 PHARM REV CODE 250: Performed by: ANESTHESIOLOGY

## 2017-06-21 RX ADMIN — DOCUSATE SODIUM 200 MG: 100 CAPSULE, LIQUID FILLED ORAL at 09:06

## 2017-06-21 RX ADMIN — IBUPROFEN 800 MG: 400 TABLET, FILM COATED ORAL at 02:06

## 2017-06-21 RX ADMIN — OXYCODONE HYDROCHLORIDE AND ACETAMINOPHEN 1 TABLET: 10; 325 TABLET ORAL at 03:06

## 2017-06-21 RX ADMIN — DOCUSATE SODIUM 200 MG: 100 CAPSULE, LIQUID FILLED ORAL at 08:06

## 2017-06-21 RX ADMIN — Medication 1 EACH: at 08:06

## 2017-06-21 RX ADMIN — IBUPROFEN 800 MG: 400 TABLET, FILM COATED ORAL at 10:06

## 2017-06-21 RX ADMIN — PANTOPRAZOLE SODIUM 40 MG: 40 TABLET, DELAYED RELEASE ORAL at 08:06

## 2017-06-21 RX ADMIN — OXYCODONE HYDROCHLORIDE AND ACETAMINOPHEN 1 TABLET: 5; 325 TABLET ORAL at 05:06

## 2017-06-21 RX ADMIN — OXYCODONE HYDROCHLORIDE AND ACETAMINOPHEN 1 TABLET: 10; 325 TABLET ORAL at 10:06

## 2017-06-21 RX ADMIN — OXYCODONE HYDROCHLORIDE AND ACETAMINOPHEN 1 TABLET: 5; 325 TABLET ORAL at 02:06

## 2017-06-21 RX ADMIN — CITALOPRAM HYDROBROMIDE 20 MG: 20 TABLET ORAL at 08:06

## 2017-06-21 RX ADMIN — OXYCODONE HYDROCHLORIDE AND ACETAMINOPHEN 1 TABLET: 5; 325 TABLET ORAL at 08:06

## 2017-06-21 NOTE — PROGRESS NOTES
POD #3    S: no complaints. Ambulating, voiding, tolerating po diet. Moderate lochia. Pain controlled with meds.    O:  Gen: AAO x 3, NAD  Vitals:    17 2309 17 1619 17 2335 17 0832   BP: 128/79 107/72 120/68 118/70   Pulse: 65 95 95 76   Resp: 18  18 18   Temp: 98.8 °F (37.1 °C) 97.8 °F (36.6 °C) 99.4 °F (37.4 °C) 98.8 °F (37.1 °C)   TempSrc: Oral Oral Oral Oral   SpO2: 100%  98% 97%   Weight:       Height:         Abdomen: soft, NT/ND  Uterus: firm, NT  Incision: C/D/I  Ext: no CCE      A/P: POD #3, s/p repeat /BTL at 30 weeks secondary to placental abruption, anemia from surgery  - routine care  - anticipate d/c home tomorrow.

## 2017-06-21 NOTE — DISCHARGE INSTRUCTIONS
Breastfeeding Discharge Instructions       Feed the baby at the earliest sign of hunger or comfort  o Hands to mouth, sucking motions  o Rooting or searching for something to suck on  o Dont wait for crying - it is a late sign of hunger and comfort.     The feedings may be 8-12 times per 24hrs and will not follow a schedule   Avoid pacifiers and bottles for the first 4 weeks   Alternate the breast you start the feeding with, or start with the breast that feels the fullest   Switch breasts when the baby takes himself off the breast or falls asleep   Keep offering breasts until the baby looks full, no longer gives hunger signs, and stays asleep when placed on his back in the crib   If the baby is sleepy and wont wake for a feeding, put the baby skin-to-skin dressed in a diaper against the mothers bare chest   Sleep near your baby   The baby should be positioned and latched on to the breast correctly  o Chest-to-chest, chin in the breast  o Babys lips are flipped outward  o Babys mouth is stretched open wide like a shout  o Babys sucking should feel like tugging to the mother  - The baby should be drinking at the breast:  o You should hear swallowing or gulping throughout the feeding  o You should see milk on the babys lips when he comes off the breast  o Your breasts should be softer when the baby is finished feeding  o The baby should look relaxed at the end of feedings  o After the 4th day and your milk is in:  o The babys poop should turn bright yellow and be loose, watery, and seedy  o The baby should have at least 3-4 poops the size of the palm of your hand per day  o The baby should have at least 6-8 wet diapers per day  o The urine should be light yellow in color  You should drink when you are thirsty and eat a healthy diet when you are    hungry.     Take naps to get the rest you need.   Take medications and/or drink alcohol only with permission of your obstetrician    or the babys  pediatrician.  You can also call the [facility],   ([phone]), Monday-Friday, 8am-5pm Central time, to get the most   up-to-date evidence-based information on the use of medications during   pregnancy and breastfeeding.      The baby should be examined by a pediatrician at 3-5 days of age.  Once your   milk comes in, the baby should be gaining at least ½ - 1oz each day and should be back to birthweight no later than 10-14 days of age.        Preparation and Hygiene:    1. Shower daily.  2. Wear a clean bra each day and wash daily in warm soapy water.  3. Change wet or moist breast pads frequently.  Moist pads can promote growth of germs.  4. Actively wash your hands, paying close attention to the area around and under your fingernails, thoroughly with soap and water for 15 seconds before pumping or handling your milk.  Re-wash your hands if you touch anything (scratching your nose, answering the phone, etc) while pumping or handling your milk.   5. Before pumping your breasts, assemble the pump collection kit and have ready the sterile container and labels.  Place these items on a clean surface next to the breastpump.  6. Each time after you have finished pumping, take apart all of the parts of the breastpump collection kit and place them in a separate cleaning container (do not place them in the sink).  Be sure to remove the yellow valve from the breastshield and separate the white membrane from the yellow valve.  Rinse all of these parts with cool water.  Then use a new sponge and/or bottle brush and dishwashing detergent to clean the parts.  Rinse off the soapy water with cool water and air dry on a clean towel covered with a clean cloth.  All parts may also be washed after each use in the top rack of a .  7. Once each day, sterilize all of the parts of the breastpump collection kit.  This can be done by boiling the kit parts for 10 minutes or by using a Quick Clean Micro-Steam Bag made by  [employer]  8. If condensation appears in the tubing, continue to run the pump with the tubing attached for 1-2 minutes or until the tubing is dry.   9. Notify your babys nurse or doctor if you become ill or need to take any medication, even over-the-counter medicines.        Collection and Storage of Expressed Breastmilk:         1. Pump your breasts at least 8-10 times every 24 hours.  Double pump (both breasts at  the same time) for at least 15-20 minutes using the most suction that is comfortable.    2. Write the date and time of pumping and the name of any medications you are takingon the babys pre-printed hospital identification label.   3. Place your babys pre-printed hospital identification label on each container of breastmilk.  Additional pre-printed labels can be obtained from your babys nurse.  If your expressed breastmilk is not correctly labeled, the nurse cannot feed the milk to the baby.       4. Place a brightly colored sticker on the top of each container of milk pumped during the first 30 days.  This identifies the milk as special and having higher levels of nutrients and anti-infective properties that are so important for your baby.  Additional stickers can be obtained from the lactation consultants or your babys nurse.  5.   Do not touch the inside of the storage containers or lids.  6.      Pour the amount of expressed milk needed for 1 of your babys feedings into each   storage container. Use a new container(s) for each pumping.  Additional storage   containers can be obtained from your babys nurse.        7.       Tightly screw the lid onto the container and place immediately into the       refrigerator fordaily transportation to the hospital.   Do not freeze your milk      unless asked to do so by your babys nurse.  However, if you are not able to      visit your baby each day, place the expressed breastmilk in the freezer.  8.   Expressed breastmilk should be refrigerated or  frozen within 1 hour of      pumping.  9.      Do not store expressed breastmilk on the door of your refrigerator or freezer where the temperature is warmer.         Transportation of Expressed Breastmilk:    1. Refrigerated breastmilk or frozen milk should be packed tightly together in a cooler with frozen, blue gel-packs to keep the milk frozen.  DO NOT USE ICE CUBES (WET ICE) TO TRANSPORT FROZEN MILK.   A clean towel can be used to fill any extra space between containers of frozen milk.  2.    Bring your expressed milk from home each time you visit the baby.

## 2017-06-21 NOTE — PLAN OF CARE
Problem: Breastfeeding (Adult,Obstetrics,Pediatric)  Goal: Signs and Symptoms of Listed Potential Problems Will be Absent, Minimized or Managed (Breastfeeding)  Signs and symptoms of listed potential problems will be absent, minimized or managed by discharge/transition of care (reference Breastfeeding (Adult,Obstetrics,Pediatric) CPG).    06/21/17 1710   Breastfeeding   Problems Assessed (Breastfeeding) all   Problems Present (Breastfeeding) other (see comments)   Pumping for NICU infant.

## 2017-06-21 NOTE — LACTATION NOTE
06/21/17 1600   Breasts WDL   Breasts WDL WDL   Maternal Infant Feeding   Maternal Emotional State relaxed   Time Spent (min) 0-15 min   Equipment Type/Education   Pump Type Symphony   Breast Pump Type double electric, hospital grade   Breast Pump Flange Type hard   Breast Pump Flange Size 24 mm   Lactation Referrals   Lactation Consult Follow up   Lactation Interventions   Maternal Breastfeeding Support diary/feeding log utilized;encouragement offered;lactation counseling provided;maternal hydration promoted;maternal nutrition promoted;maternal rest encouraged   lactation rounds, pt pumping for NICU infant. No questions or concerns at this time.

## 2017-06-22 VITALS
HEART RATE: 97 BPM | DIASTOLIC BLOOD PRESSURE: 69 MMHG | HEIGHT: 59 IN | WEIGHT: 148 LBS | BODY MASS INDEX: 29.84 KG/M2 | TEMPERATURE: 98 F | SYSTOLIC BLOOD PRESSURE: 120 MMHG | OXYGEN SATURATION: 96 % | RESPIRATION RATE: 18 BRPM

## 2017-06-22 LAB
BLD PROD TYP BPU: NORMAL
BLD PROD TYP BPU: NORMAL
BLOOD UNIT EXPIRATION DATE: NORMAL
BLOOD UNIT EXPIRATION DATE: NORMAL
BLOOD UNIT TYPE CODE: 6200
BLOOD UNIT TYPE CODE: 6200
BLOOD UNIT TYPE: NORMAL
BLOOD UNIT TYPE: NORMAL
CODING SYSTEM: NORMAL
CODING SYSTEM: NORMAL
DISPENSE STATUS: NORMAL
DISPENSE STATUS: NORMAL
TRANS ERYTHROCYTES VOL PATIENT: NORMAL ML
TRANS ERYTHROCYTES VOL PATIENT: NORMAL ML

## 2017-06-22 PROCEDURE — 90471 IMMUNIZATION ADMIN: CPT | Performed by: OBSTETRICS & GYNECOLOGY

## 2017-06-22 PROCEDURE — 90715 TDAP VACCINE 7 YRS/> IM: CPT | Performed by: OBSTETRICS & GYNECOLOGY

## 2017-06-22 PROCEDURE — 63600175 PHARM REV CODE 636 W HCPCS: Performed by: OBSTETRICS & GYNECOLOGY

## 2017-06-22 PROCEDURE — 25000003 PHARM REV CODE 250: Performed by: OBSTETRICS & GYNECOLOGY

## 2017-06-22 PROCEDURE — 25000003 PHARM REV CODE 250: Performed by: ANESTHESIOLOGY

## 2017-06-22 RX ORDER — OXYCODONE AND ACETAMINOPHEN 5; 325 MG/1; MG/1
1 TABLET ORAL EVERY 4 HOURS PRN
Qty: 31 TABLET | Refills: 0 | Status: SHIPPED | OUTPATIENT
Start: 2017-06-22 | End: 2017-06-30

## 2017-06-22 RX ORDER — IBUPROFEN 800 MG/1
800 TABLET ORAL EVERY 8 HOURS
Qty: 31 TABLET | Refills: 0 | Status: SHIPPED | OUTPATIENT
Start: 2017-06-22 | End: 2017-07-24

## 2017-06-22 RX ORDER — IBUPROFEN 800 MG/1
800 TABLET ORAL EVERY 8 HOURS
Qty: 30 TABLET | Refills: 0 | Status: SHIPPED | OUTPATIENT
Start: 2017-06-22 | End: 2017-06-22

## 2017-06-22 RX ORDER — CITALOPRAM 20 MG/1
20 TABLET, FILM COATED ORAL DAILY
Qty: 1 TABLET | Refills: 3 | Status: SHIPPED | OUTPATIENT
Start: 2017-06-22 | End: 2017-06-30 | Stop reason: SDUPTHER

## 2017-06-22 RX ORDER — OXYCODONE AND ACETAMINOPHEN 5; 325 MG/1; MG/1
1 TABLET ORAL EVERY 4 HOURS PRN
Qty: 30 TABLET | Refills: 0 | Status: SHIPPED | OUTPATIENT
Start: 2017-06-22 | End: 2017-06-22

## 2017-06-22 RX ADMIN — IBUPROFEN 800 MG: 400 TABLET, FILM COATED ORAL at 06:06

## 2017-06-22 RX ADMIN — Medication 1 EACH: at 11:06

## 2017-06-22 RX ADMIN — OXYCODONE HYDROCHLORIDE AND ACETAMINOPHEN 1 TABLET: 5; 325 TABLET ORAL at 11:06

## 2017-06-22 RX ADMIN — CLOSTRIDIUM TETANI TOXOID ANTIGEN (FORMALDEHYDE INACTIVATED), CORYNEBACTERIUM DIPHTHERIAE TOXOID ANTIGEN (FORMALDEHYDE INACTIVATED), BORDETELLA PERTUSSIS TOXOID ANTIGEN (GLUTARALDEHYDE INACTIVATED), BORDETELLA PERTUSSIS FILAMENTOUS HEMAGGLUTININ ANTIGEN (FORMALDEHYDE INACTIVATED), BORDETELLA PERTUSSIS PERTACTIN ANTIGEN, AND BORDETELLA PERTUSSIS FIMBRIAE 2/3 ANTIGEN 0.5 ML: 5; 2; 2.5; 5; 3; 5 INJECTION, SUSPENSION INTRAMUSCULAR at 01:06

## 2017-06-22 RX ADMIN — PANTOPRAZOLE SODIUM 40 MG: 40 TABLET, DELAYED RELEASE ORAL at 11:06

## 2017-06-22 RX ADMIN — CITALOPRAM HYDROBROMIDE 20 MG: 20 TABLET ORAL at 11:06

## 2017-06-22 NOTE — DISCHARGE SUMMARY
[facility]  Obstetrics  Discharge Summary      Patient Name: Enedina Samano  MRN: [id number]  Admission Date: 2017  Hospital Length of Stay: 4 days  Discharge Date and Time:  2017 1:03 PM  Attending Physician: Vandana Tabares MD   Discharging Provider: Apple Radford MD  Primary Care Provider: Tj Moss MD    HPI: Ms. Samano presents with vaginal bleeding.  She was transferred here from [place], where she was vacationing for the weekend. She woke up at 0630 this morning and found that she had bright red bleeding, she then went to the hospital in [place].  She was found to have  contractions while she was there, cervix closed, but she continued to have contractions and vaginal spotting.  She was then transferred here for MFM/admission.     She has had prenatal care with Dr. Tabares. Only complication is previous LTCD    Procedure(s) (LRB):  DELIVERY- SECTION WITH TUBAL (N/A)     Hospital Course:   POD#2: Enedina is in good post-op condition.  Pain controlled by medications, ambulating/eating/drinking without difficulty.  Lochia less than menses.  Baby boy in NICU doing well.      Postop Day #4 doing well and ready to go home, desires refill of Celexa Pt is ambulating, tolerating a regular diet, voiding and doing well, pumping breast milk. Baby is in the NICU, but doing well.     Consults         Status Ordering Provider     Inpatient consult to Perinatology  Once     Provider:  (Not yet assigned)    Completed PAPA PRIEST          Final Active Diagnoses:    Diagnosis Date Noted POA    PRINCIPAL PROBLEM:   delivery, delivered, current hospitalization [O82] 2017 No    Maternal anemia with delivery, with current postpartum complication [O99.03] 2017 No    Placenta abruption, delivered, current hospitalization [O45.90] 2017 Yes    Anxiety [F41.9] 2017 Yes    GERD with esophagitis [K21.0] 2017 Yes      Problems  Resolved During this Admission:    Diagnosis Date Noted Date Resolved POA    Vaginal bleeding in pregnancy [O46.90] 2017 Yes    30 weeks gestation of pregnancy [Z3A.30] 2017 Not Applicable        Labs: All labs within the past 24 hours have been reviewed    Feeding Method: breast    Immunizations     Date Immunization Status Dose Route/Site Given by    17 1823 MMR Incomplete 0.5 mL Subcutaneous/Left deltoid     17 1823 Tdap Incomplete 0.5 mL Intramuscular/Left deltoid     17 1202 Tdap Incomplete 0.5 mL Intramuscular/Left deltoid           Delivery:    Episiotomy: None   Lacerations: None   Repair suture:     Repair # of packets: 10   Blood loss (ml):       Birth information:  YOB: 2015   Time of birth: 4:03 PM   Sex: male   Delivery type: , Low Transverse   Gestational Age: 30w0d    Delivery Clinician:      Other providers:       Additional  information:  Forceps:    Vacuum:    Breech:    Observed anomalies      Living?:           APGARS  One minute Five minutes Ten minutes   Skin color:         Heart rate:         Grimace:         Muscle tone:         Breathing:         Totals: 4  7        Placenta: Delivered:       appearance    Pending Diagnostic Studies:     None          Discharged Condition: good    Disposition: Home or Self Care    Follow Up:  Follow-up Information     Vandana Tabares MD In 2 weeks.    Specialties:  Gynecology, Obstetrics, Obstetrics and Gynecology  Why:  For wound re-check  Contact information:  [address]  [address]  [place] [id number] [phone]             Vandana Tabares MD In 6 weeks.    Specialties:  Gynecology, Obstetrics, Obstetrics and Gynecology  Why:  postpartum checkup  Contact information:  [address]  [address]  [place] [id number] [phone]                 Patient Instructions:     Lifting restrictions   Scheduling Instructions: No heavy lifting > 20 lb for 2 weeks      No dressing needed   Scheduling Instructions: Ok to shower and clean with soap and water     Activity as tolerated   Scheduling Instructions: Pelvic rest x 6 weeks, no driving x 2 weeks       Medications:  Current Discharge Medication List      START taking these medications    Details   !! citalopram (CELEXA) 20 MG tablet Take 1 tablet (20 mg total) by mouth once daily.  Qty: 1 tablet, Refills: 3      ibuprofen (ADVIL,MOTRIN) 800 MG tablet Take 1 tablet (800 mg total) by mouth every 8 (eight) hours.  Qty: 31 tablet, Refills: 0      oxycodone-acetaminophen (PERCOCET) 5-325 mg per tablet Take 1 tablet by mouth every 4 (four) hours as needed.  Qty: 31 tablet, Refills: 0       !! - Potential duplicate medications found. Please discuss with provider.      CONTINUE these medications which have NOT CHANGED    Details   !! citalopram (CELEXA) 20 MG tablet TK 1 T PO QD  Qty: 30 tablet, Refills: 11    Associated Diagnoses: Depression, unspecified depression type      esomeprazole (NEXIUM) 40 MG capsule Take 1 capsule (40 mg total) by mouth before breakfast. Annual appt must be made before additional refills are given.  Qty: 30 capsule, Refills: 0    Associated Diagnoses: Gastroesophageal reflux disease, esophagitis presence not specified      levocetirizine (XYZAL) 5 MG tablet TK 1 T PO QAM  Refills: 6       !! - Potential duplicate medications found. Please discuss with provider.          Apple Radford MD  Obstetrics  [facility]

## 2017-06-22 NOTE — LACTATION NOTE
LC did DC teaching for NICU mother pumping for her baby. Mother has NICU Blue folder for lactation. Reviewed how to work pump, how to keep track of pumpings, how to label nicu breastmilk, how to clean pump parts and bring milk to NICU even if it is only a drop of milk. NICU uses mother's milk for mouth care so even small amounts are ok to bring to NICU. Mother aware to pump 8 or more times a day for 15-20 minutes. Mother is aware of proper techniques for transporting her breastmilk and is aware of the written instructions in her blue folder. Mother is using a Symphony pump at home ( they rented for 3 months)and is aware that she can use the Symphony breastpump at the baby's bedside when she visits. Mother has the [facility] [place] [place] phone number and the [facility] phone number to call for further questions.

## 2017-06-22 NOTE — PLAN OF CARE
Problem: Patient Care Overview  Goal: Plan of Care Review  Outcome: Outcome(s) achieved Date Met: 17  VSS. Patient ambulating and voiding without difficulty. Pain is well controlled with oral medications. Pt pumping for  in NICU. Pt will follow up in 2 weeks with doctors for an incision check and a postpartum check up in six weeks. Discharge teaching and paperwork given and all questions answered. All prescriptions filled with pharmacy down stairs. Mother wheeling off unit to go home.

## 2017-06-22 NOTE — SUBJECTIVE & OBJECTIVE
Hospital course: POD#2: Enedina is in good post-op condition.  Pain controlled by medications, ambulating/eating/drinking without difficulty.  Lochia less than menses.  Baby boy in NICU doing well.      Postop Day #4 doing well and ready to go home, desires refill of Celexa    Patient is breastfeeding. Her baby is in the NICU.    Objective:     Vital Signs (Most Recent):  Temp: 97.9 °F (36.6 °C) (06/22/17 0806)  Pulse: 97 (06/22/17 0806)  Resp: 18 (06/22/17 0806)  BP: 120/69 (06/22/17 0806)  SpO2: 96 % (06/22/17 0806) Vital Signs (24h Range):  Temp:  [97.9 °F (36.6 °C)-98.7 °F (37.1 °C)] 97.9 °F (36.6 °C)  Pulse:  [84-97] 97  Resp:  [18] 18  SpO2:  [96 %-99 %] 96 %  BP: (110-121)/(69-72) 120/69     Weight: 67.1 kg (148 lb)  Body mass index is 29.89 kg/m².      Intake/Output Summary (Last 24 hours) at 06/22/17 1310  Last data filed at 06/21/17 1700   Gross per 24 hour   Intake              700 ml   Output              600 ml   Net              100 ml       Significant Labs:  Lab Results   Component Value Date    GROUPTRH AB POS 06/18/2017    HEPBSAG Negative 02/02/2017    STREPBCULT No Group B Streptococcus isolated 06/09/2017     No results for input(s): HGB, HCT in the last 48 hours.    I have personallly reviewed all pertinent lab results from the last 24 hours.    Physical Exam:   Constitutional: She appears well-developed and well-nourished. No distress.    HENT:   Head: Normocephalic and atraumatic.    Eyes: EOM are normal.    Neck: Neck supple.    Cardiovascular: Normal rate and regular rhythm.     Pulmonary/Chest: Effort normal and breath sounds normal. No respiratory distress.        Abdominal: Soft. She exhibits abdominal incision. She exhibits no distension. There is no tenderness. There is no rebound and no guarding.   Fundus: involuting, firm, below umbilicus and nontender  Incision: clean and dry, intact with some skin ecchymosis in mons, consistent with history of abruption             Musculoskeletal:  She exhibits no edema.   Breasts: soft, nontender, nonengorged       Neurological: She is alert.    Skin: Skin is warm and dry. She is not diaphoretic.    Psychiatric: She has a normal mood and affect. Her behavior is normal. Judgment and thought content normal.

## 2017-06-22 NOTE — PROGRESS NOTES
[facility]  Obstetrics  Postpartum Progress Note    Patient Name: Enedina Samano  MRN: [id number]  Admission Date: 2017  Hospital Length of Stay: 4 days  Attending Physician: Vandana Tabares MD  Primary Care Provider: Tj Moss MD    Subjective:     Principal Problem: delivery, delivered, current hospitalization    Hospital course: POD#2: Enedina is in good post-op condition.  Pain controlled by medications, ambulating/eating/drinking without difficulty.  Lochia less than menses.  Baby boy in NICU doing well.      Postop Day #4 doing well and ready to go home, desires refill of Celexa    Patient is breastfeeding. Her baby is in the NICU.    Objective:     Vital Signs (Most Recent):  Temp: 97.9 °F (36.6 °C) (17 0806)  Pulse: 97 (17 08)  Resp: 18 (17 08)  BP: 120/69 (17 08)  SpO2: 96 % (17) Vital Signs (24h Range):  Temp:  [97.9 °F (36.6 °C)-98.7 °F (37.1 °C)] 97.9 °F (36.6 °C)  Pulse:  [84-97] 97  Resp:  [18] 18  SpO2:  [96 %-99 %] 96 %  BP: (110-121)/(69-72) 120/69     Weight: 67.1 kg (148 lb)  Body mass index is 29.89 kg/m².      Intake/Output Summary (Last 24 hours) at 17 1310  Last data filed at 17 1700   Gross per 24 hour   Intake              700 ml   Output              600 ml   Net              100 ml       Significant Labs:  Lab Results   Component Value Date    GROUPTRH AB POS 2017    HEPBSAG Negative 2017    STREPBCULT No Group B Streptococcus isolated 2017     No results for input(s): HGB, HCT in the last 48 hours.    I have personallly reviewed all pertinent lab results from the last 24 hours.    Physical Exam:   Constitutional: She appears well-developed and well-nourished. No distress.    HENT:   Head: Normocephalic and atraumatic.    Eyes: EOM are normal.    Neck: Neck supple.    Cardiovascular: Normal rate and regular rhythm.     Pulmonary/Chest: Effort normal and breath sounds  normal. No respiratory distress.        Abdominal: Soft. She exhibits abdominal incision. She exhibits no distension. There is no tenderness. There is no rebound and no guarding.   Fundus: involuting, firm, below umbilicus and nontender  Incision: clean and dry, intact with some skin ecchymosis in mons, consistent with history of abruption             Musculoskeletal: She exhibits no edema.   Breasts: soft, nontender, nonengorged       Neurological: She is alert.    Skin: Skin is warm and dry. She is not diaphoretic.    Psychiatric: She has a normal mood and affect. Her behavior is normal. Judgment and thought content normal.       Assessment/Plan:     36 y.o. female  for:    Placenta abruption, delivered, current hospitalization     no need for blood transfusion in mom        Maternal anemia with delivery, with current postpartum complication     stable        GERD with esophagitis    Stable          Anxiety    Stabl , needs refill of celexa 20 mg daily        *  delivery, delivered, current hospitalization    Doing well and ready for discharge to home             Disposition: As patient meets milestones, will plan to discharge today.    Apple Radford MD  Obstetrics  [facility], [place], operated by [facility]

## 2017-06-26 ENCOUNTER — POSTPARTUM VISIT (OUTPATIENT)
Dept: OBSTETRICS AND GYNECOLOGY | Facility: CLINIC | Age: 37
End: 2017-06-26
Payer: COMMERCIAL

## 2017-06-26 ENCOUNTER — TELEPHONE (OUTPATIENT)
Dept: OBSTETRICS AND GYNECOLOGY | Facility: CLINIC | Age: 37
End: 2017-06-26

## 2017-06-26 VITALS
SYSTOLIC BLOOD PRESSURE: 122 MMHG | BODY MASS INDEX: 28.31 KG/M2 | DIASTOLIC BLOOD PRESSURE: 84 MMHG | WEIGHT: 140.44 LBS | HEIGHT: 59 IN

## 2017-06-26 DIAGNOSIS — R10.31 DISCOMFORT OF RIGHT GROIN: Primary | ICD-10-CM

## 2017-06-26 DIAGNOSIS — Z98.891 S/P CESAREAN SECTION: ICD-10-CM

## 2017-06-26 DIAGNOSIS — R39.89 SENSATION OF PRESSURE IN BLADDER AREA: ICD-10-CM

## 2017-06-26 PROCEDURE — 99213 OFFICE O/P EST LOW 20 MIN: CPT | Mod: S$GLB,,, | Performed by: NURSE PRACTITIONER

## 2017-06-26 PROCEDURE — 99999 PR PBB SHADOW E&M-EST. PATIENT-LVL III: CPT | Mod: PBBFAC,,, | Performed by: NURSE PRACTITIONER

## 2017-06-26 PROCEDURE — 87086 URINE CULTURE/COLONY COUNT: CPT

## 2017-06-26 NOTE — TELEPHONE ENCOUNTER
Dr Tabares pt calling, post partum 2wks had baby on 6-18-17 and had a tubal.Pt states she has this right groin pain which is pretty painful and wants to be seen at [facility]. Pt  [phone]

## 2017-06-26 NOTE — PROGRESS NOTES
"Chief Complaint: right groin burning pain    HPI: 36 y.o.  is 1 weeks postpartum s/p  and BTL post early delivery for abruption. She has burning pain in her right groin. She reports that after delivery when she went to stand and walk she felt the pulling burning discomfort. It has been in the same area of the right groin since delivery/inpatient. She just noticed it more recently being uncomfortable. She states it is not a constant pain but that it is only elicited with movement or turning of her right leg. She has no pain or burning at the incision site, she reports eccymosis but this is not new or changed, no drainage from site or opening that she is aware of, no lumps or masses, or erythema. Did not labor, she has no recollection of pulling a muscle prior delivery. She is not taking the motrin and very limited narcotic use. No fever/chills/n/v/. Mild bladder pressure    ROS   Systemic: Not feeling tired (fatigue).  No fever chills   Gastrointestinal: No nausea, vomiting, no abdominal pain.  No diarrhea.  Genitourinary: No dysuria. No Pelvic Pain  Skin: No rash.  /84   Ht 4' 11" (1.499 m)   Wt 63.7 kg (140 lb 6.9 oz)   LMP 2016   Breastfeeding? No   BMI 28.36 kg/m²     Physical Exam  Vital Signs: ° Normal.  General Appearance: ° well developed.  ° Well nourished.  Neck: °Symmetrical °Trachea appearance mid-line  Eyes: °Extra-ocular movements normal   Respiratory: °No respiratory distress noted, °no use of accessory muscles  Right Groin: no bulge, no ecchymosis, no tenderness to palpation, some discomfort was elicited while let was bent and external rotation, the discomfort was in the right groin, resolved with placing leg in straight position.  Abdomin: incision is well approximated, no erythema, no tenderness, no induration, firmness, minimal edema noted, no masses, not tender, no tenderness at McBurney's point  Mons: old stage healing eccymosis  Genitalia:External: ° Vulva was " normal.  ° Genitalia exhibited no lesion.  Psychiatric: Affect: ° Normal.  Neurological: ° No disorientation   Skin: °No lesions noted    Plan:  1. Right Inguinal pain-- would related to MS at this time, recommend motrin and no sharp movements. Incision healed, no signs of masses/abcess or infection or hernia. Keep incision and FU apt on Friday with Dr. Tabares. Did order urine culture

## 2017-06-26 NOTE — TELEPHONE ENCOUNTER
[place] post partum pt complaining of pain in her right groin area. Stating that it burns if she moves the wrong way and that she did not feel this with her first csection. Scheduled her to see Vero to day at 11:20.

## 2017-06-28 ENCOUNTER — TELEPHONE (OUTPATIENT)
Dept: OBSTETRICS AND GYNECOLOGY | Facility: CLINIC | Age: 37
End: 2017-06-28

## 2017-06-28 LAB — BACTERIA UR CULT: NO GROWTH

## 2017-06-28 NOTE — TELEPHONE ENCOUNTER
Neg urine culture. Pt reports some relief with High Dose motrin. Does feel when she pushes on groin it is tender/bruised feeling, no changes that she has noticed since her visit. Area is soft, no masses/absecess   Discussed warm vs cold packs and continue motrin. Keep fu apt as well. I did discuss case with another MD on day of visit

## 2017-06-30 ENCOUNTER — POSTPARTUM VISIT (OUTPATIENT)
Dept: OBSTETRICS AND GYNECOLOGY | Facility: CLINIC | Age: 37
End: 2017-06-30
Payer: COMMERCIAL

## 2017-06-30 VITALS
BODY MASS INDEX: 27.4 KG/M2 | HEIGHT: 59 IN | DIASTOLIC BLOOD PRESSURE: 74 MMHG | SYSTOLIC BLOOD PRESSURE: 110 MMHG | WEIGHT: 135.94 LBS

## 2017-06-30 PROCEDURE — 0503F POSTPARTUM CARE VISIT: CPT | Mod: S$GLB,,, | Performed by: OBSTETRICS & GYNECOLOGY

## 2017-06-30 PROCEDURE — 99999 PR PBB SHADOW E&M-EST. PATIENT-LVL III: CPT | Mod: PBBFAC,,, | Performed by: OBSTETRICS & GYNECOLOGY

## 2017-06-30 NOTE — PROGRESS NOTES
"36 y.o. female for postpartum visit.  Patient c/o right groin pain that is starting to improve; taking ibuprofen 800 mg q 12 hours.  Baby is in NICU and doing well. She is pumping breastmilk for now. Patient is taking Celexa; reports that felt fine until yesterday when started feeling weepy. Vaginal bleeding had stopped but then restarted yesterday. Denies si/hi. Just having a difficult time with baby in NICU, 2 kids at home, postpartum state, elderly grandparents.    Exam  General - well appearing, no apparent distress  Vitals:    06/30/17 1039   BP: 110/74   Weight: 61.7 kg (135 lb 14.6 oz)   Height: 4' 11" (1.499 m)   PainSc:   3     Abdomen - soft, non tender, non distended   Incision - well healed.  Pelvic - deferred  Extremeties - no edema    Assessment:  Encounter Diagnosis   Name Primary?    Encounter for routine postpartum follow-up Yes        F/u - return in 2 weeks at [facility] when she is going to NICU, will call if has issues before that  "

## 2017-07-14 ENCOUNTER — POSTPARTUM VISIT (OUTPATIENT)
Dept: OBSTETRICS AND GYNECOLOGY | Facility: CLINIC | Age: 37
End: 2017-07-14
Payer: COMMERCIAL

## 2017-07-14 VITALS
WEIGHT: 134.81 LBS | DIASTOLIC BLOOD PRESSURE: 84 MMHG | HEIGHT: 59 IN | SYSTOLIC BLOOD PRESSURE: 112 MMHG | BODY MASS INDEX: 27.18 KG/M2

## 2017-07-14 PROCEDURE — 0503F POSTPARTUM CARE VISIT: CPT | Mod: S$GLB,,, | Performed by: NURSE PRACTITIONER

## 2017-07-14 PROCEDURE — 99999 PR PBB SHADOW E&M-EST. PATIENT-LVL III: CPT | Mod: PBBFAC,,, | Performed by: NURSE PRACTITIONER

## 2017-07-14 NOTE — PATIENT INSTRUCTIONS
Post-Partum Education:  1. Skin and Muscles--During the course of pregnancy, her skin and muscles stretched over her expanding abdomen. After pregnancy, it may take time for the skin to shrink to its pre-pregnancy state and for her abdominal muscles to become strong again. At 6-8 weeks core-strengthening exercises can help in recovery of her abdominal muscles.   2. Weight--Staying hydrated, good nutrition and adequate sleep are vital to losing weight and aid in feeling well.   3. Nutrition and Breastfeeding--If you are breast feeding it is advised to continue a daily prenatal vitamin, eat small meals with snacks which have a balance of protein, fluids, veggies, fruits and other complex carbohydrates.   4. Menstrual periods--A monthly cycle may not happen right away. If you are breast-feeding, you probably will not have a period for that first few months. If you are bottle feeding, the onset of menstruation is usually between 6-12 weeks after delivery. It is possible to get pregnant even if you are not menstruating so contraception is advised. The first period is usually heavier and more erratic than your normal period and may be associated with clots.   5. Incontinence--Urinary stress incontinence or the inability to fully empty her bladder after delivery is not uncommon after pregnancy. This is related to changes in hormones, swelling and the effect of delivery on the genitals. This should improve over time. Performing Kegel exercises and attempting to empty your bladder every two hours can help with urinary incontinence.  If the leaking of urine continues after 6 months, then she should notify the office.   6. Sexual Activity--We advise no intercourse until after your post-partum check-up. Once cleared to resume sexual intercourse you will probably require lubrication. There will be some discomfort when you reestablish sexual activity, but this should gradually diminish. It sometimes takes several months before  you are back to normal. If you are breast feeding, you might find that vaginal lubrication is a problem and this may be improved by a water soluble lubricant such as KY jelly. It is possible to get pregnant at any time after child birth, even if you have not had a menstrual period, so contraception is advised.   7. Stiches--Stitches will dissolve over a four week period and usually do not need to be removed. To aid with the healing process and help relieve discomfort we recommend: Use a spray bottle provided at the hospital to frequently clean the area.  Sitz baths or warm tub baths using approximately four inches of water in the bath tub, 2-3 per day, allowing the water to continuously run as it is slowly draining. You should continue this for approximately one week. The warmth will increase blood flow and enhances healing.  8. Vaginal discharge or lochia-- is normal and will occur for 2-3 weeks and occasionally longer. At first it will be bright red, and gradually change to pink by the second weeks and finally a yellowish discharge. It may have a slight odor and should disappear by 6 weeks. Sometimes excessive activity will cause return of the red color for several days. You may occasionally pass a blood clot. This is acceptable as long as your flow is not heavy or steady. Do not douche or use tampons until you come in for your 6 week check-up as this could cause an infection. If you feel your discharge is too heavy, please call us.  9. Sleep--It is important to get enough sleep. Lack of sleep can lead to depression, anxiety, unhappiness, inability to think, lack of judgment and more. Ask and accept help when needed.  10. Post-Partum Depression--is real and if symptoms develop you should notify your health care provider. Patients can experience baby-blues after delivery. This is related to hormonal changes and life adjustments after a baby. You may feel tearful at times. Baby Blues usually doesnt last past  6 weeks post-partum. When symptoms are more severe/not getting better/past 6 weeks concerns for post-partum depression should be evaluated. Symptoms of postpartum depression include: Feeling sad, anxious or empty, Having panic attacks, Difficulty feeling close to your baby or feeling overly involved with everything connected to your baby, Lack of energy, feeling very tired, Lack of interest in normal activities, Isolating yourself, Changes in sleeping or eating patterns, Feeling hopeless, helpless, guilty or worthless, Feeling toribio or irritable, Problems concentrating or making simple decisions, Thoughts about hurting yourself or your baby, even if you will not act on them, Thoughts about death or suicide.   11. Bathing--You only shower for the first 4-6 weeks. Thereafter baths and swimming are acceptable.   12. Travel--It is advised no long travel via auto or plane in the first two weeks.   13. Driving--Avoid driving if you are taking any narcotics, otherwise, you may begin to drive after the second or fourth week depending on how you feel and clearance by your provider. Prior to this your leg reflexes are poor. Of course, when you do first drive it should be for short distances. If you had a  section make sure you have someone with you when you first drive.   14. Hemorrhoids--Hemorrhoids that appear for the first time late in pregnancy or as a result of delivery will usually get better and disappear. They may be compounded by constipation which is not uncommon during the first few weeks. Both problems are usually overcome by reestablishing good diets including 6-8 glasses of water per day, citrus fruits, roughage in your diet (celery, lettuce, greens, etc.) and if needed stool softeners (Colace, Senokot). If needed a mild laxative (milk of magnesia or Pericolace) is acceptable. Avoid straining with stools. If your hemorrhoids are very painful you should try sitz baths and local anesthetics as discussed  for care for stitches. Ice packs are also helpful in reducing swelling and pain.   15. BREASTS (IF YOU ARE BREAST-FEEDING)-- Cleanse your nipples with warm water only. Wear a bra with good support. To help prevent sore nipples, make sure the baby is properly positioned on the breast. The babys mouth should cover the nipple as much as possible. You should change the position of the baby with each nursing so the stress will be rotated to different parts of the nipple. In the early days of breast feeding, you should air dry your nipples for ten minutes after each nursing session.   16. Cracked Nipples--If your nipples are extremely sore, you may apply ice or ice water to the nipple for a few minutes before nursing to reduce the immediate pain when your baby first grasps the breast. You can also do this by taking a warm shower or applying warm compresses to the breast and then manually squeezing out some milk. This is also beneficial in preventing or relieving any engorgement (fullness) of the breasts. Engorgement often occurs 2-5 days after delivery when the milk comes in. Frequent nursing and varying the babys position on the breast are probably the most helpful things a breast-feeding mother can do to relieve engorgement.  17. Medications--Most medications which are permissible to take when you are pregnant are also permissible when you are breast-feeding (i.e. Tylenol), however you should call if you have questions.   18. BREASTS (IF YOU ARE NOT BREAST-FEEDING)--Breast milk production may still occur in 2-5 days after delivery. In order to alleviate discomfort from engorgement, keep your breasts well supported with a tight bra, apply ice packs to your breasts, take Tylenol or Advil for the discomfort.The fullness and discomfort will usually last 3-4 days. Breast engorgement which is associated with redness, tenderness, fever or cracked nipples could be a sign of breast infection (mastitis). Contact our office as  soon as possible for evaluation.   19. Swelling--Swelling of the legs may actually worsen when you get home. This is caused by the administration of large amounts of intravenous fluids you receive in the hospital and increased standing once you are discharged from the hospital. In most cases it disappears within the first couple of weeks. If you develop pain, redness, numbness in your leg/calf(s) or have concerns and swelling not better contact your health care provider.  20. When to Call--We are always available to you, but please call us when your problem begins. Remember, problems get worse at night. CALL our office if you have: Severe chills and/or fever greater than 100.5 degrees, Excessively heavy or prolonged vaginal bleeding, Frequency, burning or blood in your urine, Fainting, Swelling, redness or tenderness in one area of a breast, If you are experiencing excessive anxiety or troubled thoughts   21.  Section Mothers--In the hospital you should get up and walk as soon as possible. The nurses in the hospital will have you up within 24 hours. Your I.V. and catheter tubes will also be taken out at this time. It wont be easy at first but movement is important for your recovery. Your objective is to go home within 3 days. Listen to your body. It will tell you when to stop and when to go. You can breast-feed successfully. It may take time and patience on your part. A good way to breast-feed your baby is to lie on your side, since you incision may be tender. Another way is holding the baby football style, cradled under one arm, as a running back holds the football when he runs or, hold the child in your lap using pillows to protect your incision. Whatever way you choose to breast-feed you can and will have a normal relationship with your baby. Keep the incision clean with soap and water. No bandage is necessary unless the incision is draining. Do not rub on the incision after showering, just pat it dry.  If the incision appears swollen, red or is draining, please let us know. You should be seen by a provider 2 weeks after a  for an incision check

## 2017-07-14 NOTE — Clinical Note
She is doing better but I wanted her to come back in about 3 weeks for second check, her scale was a 5, taking celexa.

## 2017-07-14 NOTE — PROGRESS NOTES
Chief Complaint: Post-Partum Visit    HPI: Patient is a 36 y.o.  status post  delivery and BTL on 2017 here at 4 week postpartum visit. Right  Groin is much better than the feelings she was having. She reports:    Vaginal Bleeding: Bleeding is very minimal, if any  Bowel Movements: Normal  [place] since Delivery: No  Depression: on celexa and feels controlled  Method of Birth Control: BTL     [place] Post-Ricarda Depression Screening Score: 5    Physical Exam   General Appearance: ° Well developed.  ° Well nourished.  Neck: Trachea: ° Showed no abnormalities.  Eyes: General/bilateral: Extraocular Movements: ° Normal.  Abdomen: incision is well approximated, no erythema, no tenderness, no induration, firmness, minimal edema noted, no masses, not tender,   Neurological: ° No disorientation was observed.  Psychiatric: Affect: ° Normal.  Skin: ° No skin lesions.  ° No perineal lesions.    Assessment and Plan:  Post-Partum--pt is currently 4 wks postpartum and overall doing well. On celexa for depression and scale is 5. She feels stable but has a lot of things going on and baby still in NICU    RTC in 3 weeks for FU

## 2017-07-20 ENCOUNTER — TELEPHONE (OUTPATIENT)
Dept: OBSTETRICS AND GYNECOLOGY | Facility: CLINIC | Age: 37
End: 2017-07-20

## 2017-07-20 NOTE — TELEPHONE ENCOUNTER
Pt would like to know if  would write a letter releasing her  so she can work from home. She said she can upload it to the portal, or she can pick it up from either office.

## 2017-07-20 NOTE — LETTER
July 20, 2017    Enedina Samano  [address] [facility] [address]         [facility]  [address]  [facility] [address]  Phone: [phone]  Fax: [phone] July 20, 2017     Patient: Enedina Samano   YOB: 1980   Date of Visit: 7/20/2017       To Whom It May Concern:    It is my medical opinion that Enedina Samano is released to work from home until further notice.      If you have any questions or concerns, please don't hesitate to call.    Sincerely,        Vandana Tabares MD

## 2017-07-24 ENCOUNTER — POSTPARTUM VISIT (OUTPATIENT)
Dept: OBSTETRICS AND GYNECOLOGY | Facility: CLINIC | Age: 37
End: 2017-07-24
Attending: OBSTETRICS & GYNECOLOGY
Payer: COMMERCIAL

## 2017-07-24 VITALS
DIASTOLIC BLOOD PRESSURE: 68 MMHG | WEIGHT: 137 LBS | BODY MASS INDEX: 27.62 KG/M2 | SYSTOLIC BLOOD PRESSURE: 112 MMHG | HEIGHT: 59 IN

## 2017-07-24 PROCEDURE — 99999 PR PBB SHADOW E&M-EST. PATIENT-LVL II: CPT | Mod: PBBFAC,,, | Performed by: OBSTETRICS & GYNECOLOGY

## 2017-07-24 PROCEDURE — 0503F POSTPARTUM CARE VISIT: CPT | Mod: S$GLB,,, | Performed by: OBSTETRICS & GYNECOLOGY

## 2017-07-24 NOTE — PROGRESS NOTES
"36 y.o. female for postpartum visit.  Patient has no complaints.  Her delivery records were reviewed.  She is pumping to give premature infant breast milk. He is still in NICU but doing well.    Exam  General - well appearing, no apparent distress  Vitals:    07/24/17 1504   BP: 112/68   Weight: 62.1 kg (137 lb 0.3 oz)   Height: 4' 11" (1.499 m)   PainSc: 0-No pain     Abdomen - soft, non tender, non distended   Incision - well healed.  Pelvic - deferred  Extremeties - no edema    Assessment:  Encounter Diagnosis   Name Primary?    Encounter for routine postpartum follow-up Yes        F/u - return in November for annual  "

## 2017-08-25 DIAGNOSIS — K21.9 GASTROESOPHAGEAL REFLUX DISEASE, ESOPHAGITIS PRESENCE NOT SPECIFIED: ICD-10-CM

## 2017-08-25 RX ORDER — ESOMEPRAZOLE MAGNESIUM 40 MG/1
40 CAPSULE, DELAYED RELEASE ORAL
Qty: 30 CAPSULE | Refills: 3 | Status: SHIPPED | OUTPATIENT
Start: 2017-08-25 | End: 2019-12-31

## 2017-11-24 ENCOUNTER — PATIENT MESSAGE (OUTPATIENT)
Dept: OBSTETRICS AND GYNECOLOGY | Facility: CLINIC | Age: 37
End: 2017-11-24

## 2017-11-24 DIAGNOSIS — N39.0 URINARY TRACT INFECTION WITHOUT HEMATURIA, SITE UNSPECIFIED: Primary | ICD-10-CM

## 2017-11-24 RX ORDER — NITROFURANTOIN 25; 75 MG/1; MG/1
100 CAPSULE ORAL 2 TIMES DAILY
Qty: 14 CAPSULE | Refills: 0 | Status: SHIPPED | OUTPATIENT
Start: 2017-11-24 | End: 2017-12-01

## 2018-09-10 ENCOUNTER — OFFICE VISIT (OUTPATIENT)
Dept: URGENT CARE | Facility: CLINIC | Age: 38
End: 2018-09-10
Payer: COMMERCIAL

## 2018-09-10 VITALS
OXYGEN SATURATION: 99 % | WEIGHT: 142 LBS | TEMPERATURE: 98 F | HEIGHT: 59 IN | RESPIRATION RATE: 16 BRPM | BODY MASS INDEX: 28.63 KG/M2 | DIASTOLIC BLOOD PRESSURE: 87 MMHG | HEART RATE: 71 BPM | SYSTOLIC BLOOD PRESSURE: 132 MMHG

## 2018-09-10 DIAGNOSIS — J34.0 CELLULITIS OF MUCOUS MEMBRANE OF NOSE: ICD-10-CM

## 2018-09-10 DIAGNOSIS — L73.8 FOLLICULITIS NARES PERFORANS: Primary | ICD-10-CM

## 2018-09-10 PROCEDURE — 3008F BODY MASS INDEX DOCD: CPT | Mod: CPTII,S$GLB,, | Performed by: INTERNAL MEDICINE

## 2018-09-10 PROCEDURE — 99214 OFFICE O/P EST MOD 30 MIN: CPT | Mod: S$GLB,,, | Performed by: INTERNAL MEDICINE

## 2018-09-10 RX ORDER — SULFAMETHOXAZOLE AND TRIMETHOPRIM 800; 160 MG/1; MG/1
1 TABLET ORAL 2 TIMES DAILY
Qty: 14 TABLET | Refills: 0 | Status: SHIPPED | OUTPATIENT
Start: 2018-09-10 | End: 2019-12-31

## 2018-09-10 RX ORDER — MUPIROCIN 20 MG/G
OINTMENT TOPICAL
Qty: 22 G | Refills: 1 | Status: SHIPPED | OUTPATIENT
Start: 2018-09-10 | End: 2020-01-31

## 2018-09-10 NOTE — PROGRESS NOTES
"Subjective:       Patient ID: Enedina Samano is a 38 y.o. female.    Vitals:  height is 4' 11" (1.499 m) and weight is 64.4 kg (142 lb). Her temperature is 98.2 °F (36.8 °C). Her blood pressure is 132/87 and her pulse is 71. Her respiration is 16 and oxygen saturation is 99%.     Chief Complaint: Rash    Pt thinks she has impetigo in her nose,thinks her son may have it again      Review of Systems   Constitution: Negative for chills and fever.   HENT: Negative for sore throat.    Respiratory: Negative for shortness of breath.    Skin: Negative for itching and rash.   Musculoskeletal: Negative for joint pain.       Objective:      Physical Exam   Constitutional: She appears well-developed and well-nourished.   HENT:   Head: Normocephalic and atraumatic.   Nose:       Eyes: Conjunctivae and EOM are normal. Pupils are equal, round, and reactive to light.   Neck: Normal range of motion. Neck supple.   Nursing note and vitals reviewed.      Assessment:       1. Folliculitis nares perforans    2. Cellulitis of mucous membrane of nose        Plan:         Folliculitis nares perforans  -     mupirocin (BACTROBAN) 2 % ointment; Apply to affected area 2 times daily  Dispense: 22 g; Refill: 1    Cellulitis of mucous membrane of nose  -     sulfamethoxazole-trimethoprim 800-160mg (BACTRIM DS) 800-160 mg Tab; Take 1 tablet by mouth 2 (two) times daily.  Dispense: 14 tablet; Refill: 0         "

## 2019-02-13 ENCOUNTER — OFFICE VISIT (OUTPATIENT)
Dept: URGENT CARE | Facility: CLINIC | Age: 39
End: 2019-02-13
Payer: COMMERCIAL

## 2019-02-13 VITALS
DIASTOLIC BLOOD PRESSURE: 81 MMHG | OXYGEN SATURATION: 95 % | HEIGHT: 59 IN | WEIGHT: 139 LBS | SYSTOLIC BLOOD PRESSURE: 115 MMHG | TEMPERATURE: 98 F | BODY MASS INDEX: 28.02 KG/M2 | RESPIRATION RATE: 19 BRPM | HEART RATE: 80 BPM

## 2019-02-13 DIAGNOSIS — J06.9 UPPER RESPIRATORY TRACT INFECTION, UNSPECIFIED TYPE: ICD-10-CM

## 2019-02-13 DIAGNOSIS — R52 GENERALIZED BODY ACHES: Primary | ICD-10-CM

## 2019-02-13 LAB
CTP QC/QA: YES
FLUAV AG NPH QL: NEGATIVE
FLUBV AG NPH QL: NEGATIVE

## 2019-02-13 PROCEDURE — 99214 PR OFFICE/OUTPT VISIT, EST, LEVL IV, 30-39 MIN: ICD-10-PCS | Mod: S$GLB,,, | Performed by: INTERNAL MEDICINE

## 2019-02-13 PROCEDURE — 3008F BODY MASS INDEX DOCD: CPT | Mod: CPTII,S$GLB,, | Performed by: INTERNAL MEDICINE

## 2019-02-13 PROCEDURE — 87804 POCT INFLUENZA A/B: ICD-10-PCS | Mod: QW,S$GLB,, | Performed by: INTERNAL MEDICINE

## 2019-02-13 PROCEDURE — 99214 OFFICE O/P EST MOD 30 MIN: CPT | Mod: S$GLB,,, | Performed by: INTERNAL MEDICINE

## 2019-02-13 PROCEDURE — 3008F PR BODY MASS INDEX (BMI) DOCUMENTED: ICD-10-PCS | Mod: CPTII,S$GLB,, | Performed by: INTERNAL MEDICINE

## 2019-02-13 PROCEDURE — 87804 INFLUENZA ASSAY W/OPTIC: CPT | Mod: QW,S$GLB,, | Performed by: INTERNAL MEDICINE

## 2019-02-13 NOTE — PROGRESS NOTES
"Subjective:       Patient ID: Enedina Samano is a 38 y.o. female.    Vitals:  height is 4' 11" (1.499 m) and weight is 63 kg (139 lb). Her oral temperature is 98.4 °F (36.9 °C). Her blood pressure is 115/81 and her pulse is 80. Her respiration is 19 and oxygen saturation is 95%.     Chief Complaint: URI (fatigue, body aches, headache, sore throat)    URI    This is a new problem. The current episode started in the past 7 days (Saturday). The problem has been gradually worsening. There has been no fever. Associated symptoms include congestion, headaches and a sore throat. Pertinent negatives include no chest pain, coughing, diarrhea, dysuria, nausea, rash or vomiting. Treatments tried: Excedrin, Tylenol, ibuprofen. The treatment provided no relief.       Constitution: Positive for fatigue. Negative for chills and fever.   HENT: Positive for congestion and sore throat.    Neck: Negative for painful lymph nodes.   Cardiovascular: Negative for chest pain and leg swelling.   Eyes: Negative for double vision and blurred vision.   Respiratory: Negative for cough and shortness of breath.    Gastrointestinal: Negative for nausea, vomiting and diarrhea.   Genitourinary: Negative for dysuria, frequency, urgency and history of kidney stones.   Musculoskeletal: Positive for muscle ache. Negative for joint pain, joint swelling and muscle cramps.   Skin: Negative for color change, pale, rash and bruising.   Allergic/Immunologic: Negative for seasonal allergies.   Neurological: Positive for headaches. Negative for dizziness, history of vertigo, light-headedness and passing out.   Hematologic/Lymphatic: Negative for swollen lymph nodes.   Psychiatric/Behavioral: Negative for nervous/anxious, sleep disturbance and depression. The patient is not nervous/anxious.        Objective:      Physical Exam   Constitutional: She appears well-developed and well-nourished.   HENT:   Head: Normocephalic and atraumatic.   Eyes: Conjunctivae and " EOM are normal. Pupils are equal, round, and reactive to light.   Neck: Normal range of motion. Neck supple.   Cardiovascular: Normal rate and regular rhythm.   Pulmonary/Chest: Effort normal and breath sounds normal.   Nursing note and vitals reviewed.      Assessment:       1. Generalized body aches    2. Upper respiratory tract infection, unspecified type        Plan:         Generalized body aches  -     POCT Influenza A/B    Upper respiratory tract infection, unspecified type

## 2019-02-13 NOTE — PATIENT INSTRUCTIONS

## 2019-12-26 ENCOUNTER — OFFICE VISIT (OUTPATIENT)
Dept: URGENT CARE | Facility: CLINIC | Age: 39
End: 2019-12-26
Payer: COMMERCIAL

## 2019-12-26 VITALS
WEIGHT: 136 LBS | RESPIRATION RATE: 15 BRPM | BODY MASS INDEX: 27.42 KG/M2 | HEART RATE: 72 BPM | TEMPERATURE: 97 F | SYSTOLIC BLOOD PRESSURE: 95 MMHG | OXYGEN SATURATION: 99 % | HEIGHT: 59 IN | DIASTOLIC BLOOD PRESSURE: 66 MMHG

## 2019-12-26 DIAGNOSIS — R30.0 DYSURIA: Primary | ICD-10-CM

## 2019-12-26 LAB
BILIRUB UR QL STRIP: NEGATIVE
GLUCOSE UR QL STRIP: NEGATIVE
KETONES UR QL STRIP: NEGATIVE
LEUKOCYTE ESTERASE UR QL STRIP: NEGATIVE
PH, POC UA: 9 (ref 5–8)
POC BLOOD, URINE: NEGATIVE
POC NITRATES, URINE: NEGATIVE
PROT UR QL STRIP: NEGATIVE
SP GR UR STRIP: 1 (ref 1–1.03)
UROBILINOGEN UR STRIP-ACNC: NORMAL (ref 0.1–1.1)

## 2019-12-26 PROCEDURE — 81003 URINALYSIS AUTO W/O SCOPE: CPT | Mod: QW,S$GLB,, | Performed by: FAMILY MEDICINE

## 2019-12-26 PROCEDURE — 99214 OFFICE O/P EST MOD 30 MIN: CPT | Mod: S$GLB,,, | Performed by: FAMILY MEDICINE

## 2019-12-26 PROCEDURE — 81003 POCT URINALYSIS, DIPSTICK, AUTOMATED, W/O SCOPE: ICD-10-PCS | Mod: QW,S$GLB,, | Performed by: FAMILY MEDICINE

## 2019-12-26 PROCEDURE — 99214 PR OFFICE/OUTPT VISIT, EST, LEVL IV, 30-39 MIN: ICD-10-PCS | Mod: S$GLB,,, | Performed by: FAMILY MEDICINE

## 2019-12-26 PROCEDURE — 87086 URINE CULTURE/COLONY COUNT: CPT

## 2019-12-26 RX ORDER — PHENAZOPYRIDINE HYDROCHLORIDE 200 MG/1
200 TABLET, FILM COATED ORAL 3 TIMES DAILY PRN
Qty: 10 TABLET | Refills: 0 | Status: SHIPPED | OUTPATIENT
Start: 2019-12-26 | End: 2019-12-31

## 2019-12-26 RX ORDER — NITROFURANTOIN 25; 75 MG/1; MG/1
100 CAPSULE ORAL 2 TIMES DAILY
Qty: 14 CAPSULE | Refills: 0 | Status: SHIPPED | OUTPATIENT
Start: 2019-12-26 | End: 2019-12-31

## 2019-12-26 NOTE — PROGRESS NOTES
"Subjective:       Patient ID: Enedina Samano is a 39 y.o. female.    Vitals:  height is 4' 11" (1.499 m) and weight is 61.7 kg (136 lb). Her oral temperature is 96.7 °F (35.9 °C). Her blood pressure is 95/66 and her pulse is 72. Her respiration is 15 and oxygen saturation is 99%.     Chief Complaint: Dysuria    Dysuria    This is a new problem. The current episode started in the past 7 days (12/22/2019). The problem occurs every urination. The problem has been unchanged. The quality of the pain is described as burning. The pain is at a severity of 4/10. The pain is moderate. There has been no fever. She is sexually active. There is no history of pyelonephritis. Associated symptoms include frequency and urgency. Pertinent negatives include no chills, hematuria, nausea, vomiting or rash. She has tried increased fluids and home medications for the symptoms. The treatment provided no relief.       Constitution: Negative for chills and fever.   Neck: Negative for painful lymph nodes.   Gastrointestinal: Negative for abdominal pain, nausea and vomiting.   Genitourinary: Positive for dysuria, frequency and urgency. Negative for urine decreased, hematuria, history of kidney stones, painful menstruation, irregular menstruation, missed menses, heavy menstrual bleeding, ovarian cysts, genital trauma, vaginal pain, vaginal discharge, vaginal bleeding, vaginal odor, painful intercourse, genital sore, painful ejaculation and pelvic pain.   Musculoskeletal: Negative for back pain.   Skin: Negative for rash and lesion.   Hematologic/Lymphatic: Negative for swollen lymph nodes.       Objective:      Physical Exam   Constitutional: She is oriented to person, place, and time. She appears well-developed and well-nourished. She is cooperative.  Non-toxic appearance. She does not appear ill. No distress.   HENT:   Head: Normocephalic and atraumatic.   Right Ear: Hearing, tympanic membrane and ear canal normal.   Left Ear: Hearing, " tympanic membrane and ear canal normal.   Nose: No mucosal edema, rhinorrhea or nasal deformity. No epistaxis. Right sinus exhibits no maxillary sinus tenderness and no frontal sinus tenderness. Left sinus exhibits no maxillary sinus tenderness and no frontal sinus tenderness.   Mouth/Throat: Uvula is midline and mucous membranes are normal. No trismus in the jaw. Normal dentition. No uvula swelling. No posterior oropharyngeal erythema.   Eyes: Conjunctivae and lids are normal. Right eye exhibits no discharge. Left eye exhibits no discharge. No scleral icterus.   Neck: Trachea normal, normal range of motion, full passive range of motion without pain and phonation normal. Neck supple.   Cardiovascular: Normal rate, regular rhythm, normal heart sounds, intact distal pulses and normal pulses.   Pulmonary/Chest: Effort normal and breath sounds normal. No respiratory distress.   Abdominal: Soft. Normal appearance and bowel sounds are normal. She exhibits no distension and no pulsatile midline mass. There is no tenderness.   Genitourinary:   Genitourinary Comments: No bladder tenderness, no cva tenderness.   Musculoskeletal: Normal range of motion. She exhibits no edema or deformity.   Neurological: She is alert and oriented to person, place, and time. She exhibits normal muscle tone. Coordination normal.   Skin: Skin is warm, dry, intact, not diaphoretic and not pale.   Psychiatric: She has a normal mood and affect. Her speech is normal and behavior is normal. Judgment and thought content normal. Cognition and memory are normal.   Nursing note and vitals reviewed.        Assessment:       1. Dysuria        Plan:         Dysuria  -     POCT Urinalysis, Dipstick, Automated, W/O Scope  -     Urine culture  -     nitrofurantoin, macrocrystal-monohydrate, (MACROBID) 100 MG capsule; Take 1 capsule (100 mg total) by mouth 2 (two) times daily. for 7 days  Dispense: 14 capsule; Refill: 0  -     phenazopyridine (PYRIDIUM) 200 MG  "tablet; Take 1 tablet (200 mg total) by mouth 3 (three) times daily as needed for Pain.  Dispense: 10 tablet; Refill: 0         Patient Instructions       Dysuria     Painful urination (dysuria) is often caused by a problem in the urinary tract.   Dysuria is pain felt during urination. It is often described as a burning. Learn more about this problem and how it can be treated.  What causes dysuria?  Possible causes include:  · Infection with a bacteria or virus such as a urinary tract infection (UTI or a sexually transmitted infection (STI)  · Sensitivity or allergy to chemicals such as those found in lotions and other products  · Prostate or bladder problems  · Radiation therapy to the pelvic area  How is dysuria diagnosed?  Your healthcare provider will examine you. He or she will ask about your symptoms and health. After talking with you and doing a physical exam, your healthcare provider may know what is causing your dysuria. He or she will usually request  a sample of your urine. Tests of your urine, or a "urinalysis," are done. A urinalysis may include:  · Looking at the urine sample (visual exam)  · Checking for substances (chemical exam)  · Looking at a small amount under a microscope (microscopic exam)  Some parts of the urinalysis may be done in the provider's office and some in a lab. And, the urine sample may be checked for bacteria and yeast (urine culture). Your healthcare provider will tell you more about these tests if they are needed.  How is dysuria treated?  Treatment depends on the cause. If you have a bacterial infection, you may need antibiotics. You may be given medicines to make it easier for you to urinate and help relieve pain. Your healthcare provider can tell you more about your treatment options. Untreated, symptoms may get worse.  When to call your healthcare provider  Call the healthcare provider right away if you have any of the following:  · Fever of 100.4°F (38°C) or higher   · No " improvement after three days of treatment  · Trouble urinating because of pain  · New or increased discharge from the vagina or penis  · Rash or joint pain  · Increased back or abdominal pain  · Enlarged painful lymph nodes (lumps) in the groin   Date Last Reviewed: 1/1/2017 © 2000-2017 [employer]. [address] [place] [address]. All rights reserved. This information is not intended as a substitute for professional medical care. Always follow your healthcare professional's instructions.      Follow up with your doctor in a few days as needed.  Return to the urgent care or go to the ER if symptoms get worse.    Douglas Pastor MD

## 2019-12-26 NOTE — PATIENT INSTRUCTIONS
"  Dysuria     Painful urination (dysuria) is often caused by a problem in the urinary tract.   Dysuria is pain felt during urination. It is often described as a burning. Learn more about this problem and how it can be treated.  What causes dysuria?  Possible causes include:  · Infection with a bacteria or virus such as a urinary tract infection (UTI or a sexually transmitted infection (STI)  · Sensitivity or allergy to chemicals such as those found in lotions and other products  · Prostate or bladder problems  · Radiation therapy to the pelvic area  How is dysuria diagnosed?  Your healthcare provider will examine you. He or she will ask about your symptoms and health. After talking with you and doing a physical exam, your healthcare provider may know what is causing your dysuria. He or she will usually request  a sample of your urine. Tests of your urine, or a "urinalysis," are done. A urinalysis may include:  · Looking at the urine sample (visual exam)  · Checking for substances (chemical exam)  · Looking at a small amount under a microscope (microscopic exam)  Some parts of the urinalysis may be done in the provider's office and some in a lab. And, the urine sample may be checked for bacteria and yeast (urine culture). Your healthcare provider will tell you more about these tests if they are needed.  How is dysuria treated?  Treatment depends on the cause. If you have a bacterial infection, you may need antibiotics. You may be given medicines to make it easier for you to urinate and help relieve pain. Your healthcare provider can tell you more about your treatment options. Untreated, symptoms may get worse.  When to call your healthcare provider  Call the healthcare provider right away if you have any of the following:  · Fever of 100.4°F (38°C) or higher   · No improvement after three days of treatment  · Trouble urinating because of pain  · New or increased discharge from the vagina or penis  · Rash or joint " pain  · Increased back or abdominal pain  · Enlarged painful lymph nodes (lumps) in the groin   Date Last Reviewed: 1/1/2017  © 0198-5494 [employer]. [address] [place] [address], [place] [address]. All rights reserved. This information is not intended as a substitute for professional medical care. Always follow your healthcare professional's instructions.      Follow up with your doctor in a few days as needed.  Return to the urgent care or go to the ER if symptoms get worse.    Douglas Pastor MD

## 2019-12-27 LAB — BACTERIA UR CULT: NO GROWTH

## 2019-12-30 ENCOUNTER — TELEPHONE (OUTPATIENT)
Dept: OBSTETRICS AND GYNECOLOGY | Facility: CLINIC | Age: 39
End: 2019-12-30

## 2019-12-30 NOTE — TELEPHONE ENCOUNTER
Dr. Tabares-- pt states that she went to an [facility] urgent care last week for a UTI. Pt states that she was given a Rx of Macrobid and is currently on day 5 of 7. Pt states she has not gotten any relief and would like to come in to be seen. Pt's # [phone]

## 2019-12-30 NOTE — TELEPHONE ENCOUNTER
"Pt states she went to urgent care for a UTI but after taking Bactrim there is no improvement.  Informed pt that her urine culture was negative. She responded with "yea that's what he said but I'm pretty sure I know what a UTI feels like".  Recommended an appt, scheduled tomorrow with Dr. Tabares.    "

## 2019-12-31 ENCOUNTER — OFFICE VISIT (OUTPATIENT)
Dept: OBSTETRICS AND GYNECOLOGY | Facility: CLINIC | Age: 39
End: 2019-12-31
Attending: OBSTETRICS & GYNECOLOGY
Payer: COMMERCIAL

## 2019-12-31 VITALS
DIASTOLIC BLOOD PRESSURE: 72 MMHG | SYSTOLIC BLOOD PRESSURE: 110 MMHG | WEIGHT: 141 LBS | HEIGHT: 59 IN | BODY MASS INDEX: 28.43 KG/M2

## 2019-12-31 DIAGNOSIS — R10.2 VAGINAL PAIN: Primary | ICD-10-CM

## 2019-12-31 LAB
BILIRUB SERPL-MCNC: NORMAL MG/DL
BLOOD URINE, POC: NORMAL
COLOR, POC UA: YELLOW
GLUCOSE UR QL STRIP: NORMAL
KETONES UR QL STRIP: NORMAL
LEUKOCYTE ESTERASE URINE, POC: NORMAL
NITRITE, POC UA: NORMAL
PH, POC UA: 5
PROTEIN, POC: NORMAL
SPECIFIC GRAVITY, POC UA: 1.01
UROBILINOGEN, POC UA: NORMAL

## 2019-12-31 PROCEDURE — 81002 URINALYSIS NONAUTO W/O SCOPE: CPT | Mod: S$GLB,,, | Performed by: OBSTETRICS & GYNECOLOGY

## 2019-12-31 PROCEDURE — 99213 OFFICE O/P EST LOW 20 MIN: CPT | Mod: 25,S$GLB,, | Performed by: OBSTETRICS & GYNECOLOGY

## 2019-12-31 PROCEDURE — 99999 PR PBB SHADOW E&M-EST. PATIENT-LVL III: CPT | Mod: PBBFAC,,, | Performed by: OBSTETRICS & GYNECOLOGY

## 2019-12-31 PROCEDURE — 81002 POCT URINE DIPSTICK WITHOUT MICROSCOPE: ICD-10-PCS | Mod: S$GLB,,, | Performed by: OBSTETRICS & GYNECOLOGY

## 2019-12-31 PROCEDURE — 99213 PR OFFICE/OUTPT VISIT, EST, LEVL III, 20-29 MIN: ICD-10-PCS | Mod: 25,S$GLB,, | Performed by: OBSTETRICS & GYNECOLOGY

## 2019-12-31 PROCEDURE — 99999 PR PBB SHADOW E&M-EST. PATIENT-LVL III: ICD-10-PCS | Mod: PBBFAC,,, | Performed by: OBSTETRICS & GYNECOLOGY

## 2019-12-31 PROCEDURE — 3008F PR BODY MASS INDEX (BMI) DOCUMENTED: ICD-10-PCS | Mod: CPTII,S$GLB,, | Performed by: OBSTETRICS & GYNECOLOGY

## 2019-12-31 PROCEDURE — 3008F BODY MASS INDEX DOCD: CPT | Mod: CPTII,S$GLB,, | Performed by: OBSTETRICS & GYNECOLOGY

## 2019-12-31 RX ORDER — TERCONAZOLE 8 MG/G
1 CREAM VAGINAL NIGHTLY
Qty: 20 G | Refills: 0 | Status: SHIPPED | OUTPATIENT
Start: 2019-12-31 | End: 2020-01-03

## 2019-12-31 NOTE — PROGRESS NOTES
Subjective:       Patient ID: Enedina Samano is a 39 y.o. female.    Chief Complaint:  Vaginal Pain      History of Present Illness  - patient presents with one week history of vaginal burning. Hasn't really noticed a discharge. Thinks it started after her last period. Takes baths. Hasn't changed soaps/tampons.  - went to urgent care to r/o UTI and took antibiotics with no relief.    Past Medical History:   Diagnosis Date    Anxiety disorder     (Nos) started having panic attacks at end of 2012. became much worse in 2012    Depression     2012       Past Surgical History:   Procedure Laterality Date     SECTION           Current Outpatient Medications:     mupirocin (BACTROBAN) 2 % ointment, Apply to affected area 2 times daily, Disp: 22 g, Rfl: 1    terconazole (TERAZOL 3) 0.8 % vaginal cream, Place 1 applicator vaginally every evening. for 3 days, Disp: 20 g, Rfl: 0    Review of patient's allergies indicates:  No Known Allergies    GYN & OB History  Patient's last menstrual period was 2019.   Date of Last Pap: 2016    OB History    Para Term  AB Living   2 2   2   2   SAB TAB Ectopic Multiple Live Births         0 2      # Outcome Date GA Lbr Jose/2nd Weight Sex Delivery Anes PTL Lv   2  / 30w0d  1.12 kg (2 lb 7.5 oz) M CS-LTranv Spinal N VENKAT      Complications: Abruptio Placenta   1  14 36w0d  2.126 kg (4 lb 11 oz) M CS-Unspec   VENKAT       Social History     Socioeconomic History    Marital status:      Spouse name: Not on file    Number of children: Not on file    Years of education: Not on file    Highest education level: Not on file   Occupational History    Not on file   Social Needs    Financial resource strain: Not on file    Food insecurity:     Worry: Not on file     Inability: Not on file    Transportation needs:     Medical: Not on file     Non-medical: Not on file   Tobacco Use    Smoking status: Never  "Smoker    Smokeless tobacco: Never Used   Substance and Sexual Activity    Alcohol use: No     Comment: socially    Drug use: No    Sexual activity: Yes     Partners: Male     Birth control/protection: None   Lifestyle    Physical activity:     Days per week: Not on file     Minutes per session: Not on file    Stress: Not on file   Relationships    Social connections:     Talks on phone: Not on file     Gets together: Not on file     Attends Voodoo service: Not on file     Active member of club or organization: Not on file     Attends meetings of clubs or organizations: Not on file     Relationship status: Not on file   Other Topics Concern    Not on file   Social History Narrative    Not on file       Family History   Problem Relation Age of Onset    Breast cancer Neg Hx     Colon cancer Neg Hx     Ovarian cancer Neg Hx     Diabetes Neg Hx     Hypertension Neg Hx        Review of Systems  Review of Systems   All other systems reviewed and are negative.       Objective:     Vitals:    12/31/19 1427   BP: 110/72   Weight: 64 kg (140 lb 15.8 oz)   Height: 4' 11" (1.499 m)       Physical Exam:   Constitutional: She appears well-developed and well-nourished. She is cooperative. No distress.             Abdominal: Soft. Normal appearance. There is no tenderness.     Genitourinary: Uterus normal. There is no rash, tenderness or lesion on the right labia. There is no rash, tenderness or lesion on the left labia. Cervix is normal. Right adnexum displays no mass, no tenderness and no fullness. Left adnexum displays no mass, no tenderness and no fullness. Vaginal discharge found.   Genitourinary Comments: Scant thick white d/c in vault.               Neurological: She is alert.          Assessment/ Plan:     Orders Placed This Encounter    POCT URINE DIPSTICK WITHOUT MICROSCOPE    terconazole (TERAZOL 3) 0.8 % vaginal cream       Enedina was seen today for vaginal pain.    Diagnoses and all orders for this " visit:    Vaginal pain  -     POCT URINE DIPSTICK WITHOUT MICROSCOPE    Other orders  -     terconazole (TERAZOL 3) 0.8 % vaginal cream; Place 1 applicator vaginally every evening. for 3 days    - u/a: negative  - will treat empirically for yeast. Patient will call me Friday. If not better, will reassess.    Follow up for annual exam.

## 2020-01-03 ENCOUNTER — TELEPHONE (OUTPATIENT)
Dept: OBSTETRICS AND GYNECOLOGY | Facility: CLINIC | Age: 40
End: 2020-01-03

## 2020-01-03 NOTE — TELEPHONE ENCOUNTER
Pt finished using Terazol last night, no relief in symptoms.  Reassured her it can take up to 1 week for complete relief of symptoms.  Recommended she give it over the weekend and if no improvement on Monday to call back.  She was instructed to call today if she didn't feel better.

## 2020-01-03 NOTE — TELEPHONE ENCOUNTER
Pt notified rx has been sent to pharmacy. Advised if no improvement by mid week next week to call back.

## 2020-01-03 NOTE — TELEPHONE ENCOUNTER
I sent in a Rx for Nuvessa and attached coupon. Please tell patient to be sure pharmacy applies it. Will treat empirically for BV.

## 2020-01-03 NOTE — TELEPHONE ENCOUNTER
Dr. Tabares pt called saying that she came in on 12/31 for a yeast infection and got a vaginal cream. Pt said she still does not fell any better. Please advise. Thank you.

## 2020-01-09 ENCOUNTER — PATIENT MESSAGE (OUTPATIENT)
Dept: OBSTETRICS AND GYNECOLOGY | Facility: CLINIC | Age: 40
End: 2020-01-09

## 2020-01-09 RX ORDER — METRONIDAZOLE 65 MG/5G
GEL TOPICAL
COMMUNITY
Start: 2020-01-06 | End: 2020-01-31

## 2020-01-15 ENCOUNTER — PATIENT MESSAGE (OUTPATIENT)
Dept: OBSTETRICS AND GYNECOLOGY | Facility: CLINIC | Age: 40
End: 2020-01-15

## 2020-01-16 RX ORDER — FLUCONAZOLE 150 MG/1
150 TABLET ORAL DAILY
Qty: 1 TABLET | Refills: 0 | Status: SHIPPED | OUTPATIENT
Start: 2020-01-16 | End: 2020-01-17

## 2020-01-31 ENCOUNTER — OFFICE VISIT (OUTPATIENT)
Dept: OBSTETRICS AND GYNECOLOGY | Facility: CLINIC | Age: 40
End: 2020-01-31
Payer: COMMERCIAL

## 2020-01-31 VITALS
BODY MASS INDEX: 27.96 KG/M2 | DIASTOLIC BLOOD PRESSURE: 78 MMHG | WEIGHT: 138.69 LBS | SYSTOLIC BLOOD PRESSURE: 124 MMHG | HEIGHT: 59 IN

## 2020-01-31 DIAGNOSIS — N94.9 VAGINAL BURNING: ICD-10-CM

## 2020-01-31 DIAGNOSIS — Z12.31 ENCOUNTER FOR MAMMOGRAM TO ESTABLISH BASELINE MAMMOGRAM: ICD-10-CM

## 2020-01-31 DIAGNOSIS — Z12.4 ENCOUNTER FOR SCREENING FOR CERVICAL CANCER: Primary | ICD-10-CM

## 2020-01-31 DIAGNOSIS — Z11.51 ENCOUNTER FOR SCREENING FOR HUMAN PAPILLOMAVIRUS (HPV): ICD-10-CM

## 2020-01-31 DIAGNOSIS — Z01.419 ENCOUNTER FOR GYNECOLOGICAL EXAMINATION: ICD-10-CM

## 2020-01-31 PROCEDURE — 99999 PR PBB SHADOW E&M-EST. PATIENT-LVL III: ICD-10-PCS | Mod: PBBFAC,,, | Performed by: OBSTETRICS & GYNECOLOGY

## 2020-01-31 PROCEDURE — 99395 PREV VISIT EST AGE 18-39: CPT | Mod: S$GLB,,, | Performed by: OBSTETRICS & GYNECOLOGY

## 2020-01-31 PROCEDURE — 99999 PR PBB SHADOW E&M-EST. PATIENT-LVL III: CPT | Mod: PBBFAC,,, | Performed by: OBSTETRICS & GYNECOLOGY

## 2020-01-31 PROCEDURE — 87624 HPV HI-RISK TYP POOLED RSLT: CPT

## 2020-01-31 PROCEDURE — 87661 TRICHOMONAS VAGINALIS AMPLIF: CPT

## 2020-01-31 PROCEDURE — 88175 CYTOPATH C/V AUTO FLUID REDO: CPT

## 2020-01-31 PROCEDURE — 99395 PR PREVENTIVE VISIT,EST,18-39: ICD-10-PCS | Mod: S$GLB,,, | Performed by: OBSTETRICS & GYNECOLOGY

## 2020-01-31 PROCEDURE — 87481 CANDIDA DNA AMP PROBE: CPT | Mod: 59

## 2020-01-31 NOTE — PROGRESS NOTES
Subjective:       Patient ID: Enedina Samano is a 39 y.o. female.    Chief Complaint:  Well Woman (last pap negative 16, no hx of HPV, no hx of mammogram- still c/o burning inside of vagina on left side )      History of Present Illness  - here for annual. Continues to have burning/stinging/tingling sensation mostly on left side of vagina on the inside. Denies increased discharge.    Past Medical History:   Diagnosis Date    Anxiety disorder     (Nos) started having panic attacks at end of 2012. became much worse in 2012    Depression     2012       Past Surgical History:   Procedure Laterality Date     SECTION         No current outpatient medications on file.    Review of patient's allergies indicates:  No Known Allergies    GYN & OB History  Patient's last menstrual period was 2020 (exact date).   Date of Last Pap: 2016    OB History    Para Term  AB Living   2 2   2   2   SAB TAB Ectopic Multiple Live Births         0 2      # Outcome Date GA Lbr Jose/2nd Weight Sex Delivery Anes PTL Lv   2  17 30w0d  1.12 kg (2 lb 7.5 oz) M CS-LTranv Spinal N VENKAT      Complications: Abruptio Placenta   1  14 36w0d  2.126 kg (4 lb 11 oz) M CS-Unspec   VENKAT       Social History     Socioeconomic History    Marital status:      Spouse name: Not on file    Number of children: Not on file    Years of education: Not on file    Highest education level: Not on file   Occupational History    Not on file   Social Needs    Financial resource strain: Not on file    Food insecurity:     Worry: Not on file     Inability: Not on file    Transportation needs:     Medical: Not on file     Non-medical: Not on file   Tobacco Use    Smoking status: Never Smoker    Smokeless tobacco: Never Used   Substance and Sexual Activity    Alcohol use: Yes     Comment: socially    Drug use: No    Sexual activity: Yes     Partners: Male     Birth  "control/protection: None   Lifestyle    Physical activity:     Days per week: Not on file     Minutes per session: Not on file    Stress: Not on file   Relationships    Social connections:     Talks on phone: Not on file     Gets together: Not on file     Attends Anabaptism service: Not on file     Active member of club or organization: Not on file     Attends meetings of clubs or organizations: Not on file     Relationship status: Not on file   Other Topics Concern    Not on file   Social History Narrative    Not on file       Family History   Problem Relation Age of Onset    Breast cancer Neg Hx     Colon cancer Neg Hx     Ovarian cancer Neg Hx     Diabetes Neg Hx     Hypertension Neg Hx        Review of Systems  Review of Systems   Respiratory: Negative for shortness of breath.    Cardiovascular: Negative for chest pain and palpitations.   Gastrointestinal: Negative for blood in stool, nausea and vomiting.   Genitourinary:        - see HPI   Skin: Negative for rash and wound.   Allergic/Immunologic: Negative for immunocompromised state.   Neurological: Negative for dizziness and syncope.   Hematological: Negative for adenopathy.   Psychiatric/Behavioral: Negative for behavioral problems.        Objective:     Vitals:    01/31/20 1230   BP: 124/78   Weight: 62.9 kg (138 lb 10.7 oz)   Height: 4' 11" (1.499 m)       Physical Exam:   Constitutional: She is oriented to person, place, and time. She appears well-developed and well-nourished.        Pulmonary/Chest: Right breast exhibits no mass, no nipple discharge, no skin change, no tenderness and no swelling. Left breast exhibits no mass, no nipple discharge, no skin change, no tenderness and no swelling. Breasts are symmetrical.        Abdominal: Soft. She exhibits no distension. There is no tenderness.     Genitourinary: Uterus normal. There is no tenderness or lesion on the right labia. There is no tenderness or lesion on the left labia. Cervix is normal. " "Right adnexum displays no mass, no tenderness and no fullness. Left adnexum displays no mass, no tenderness and no fullness. Vaginal discharge found. Additional cervical findings: pap smear done  Genitourinary Comments: Scant thin white discharge in vault. No lesions seen.           Musculoskeletal: Moves all extremeties.       Neurological: She is alert and oriented to person, place, and time.     Psychiatric: She has a normal mood and affect.        Assessment/ Plan:     Orders Placed This Encounter    HPV High Risk Genotypes, PCR    Mammo Digital Screening Bilat w/ Jeremiah    Liquid-Based Pap Smear, Screening       Enedina was seen today for well woman.    Diagnoses and all orders for this visit:    Encounter for screening for cervical cancer   -     Liquid-Based Pap Smear, Screening    Encounter for screening for human papillomavirus (HPV)  -     HPV High Risk Genotypes, PCR    Encounter for screening mammogram for breast cancer    Encounter for mammogram to establish baseline mammogram  -     Mammo Digital Screening Bilat w/ Jeremiah; Future    Encounter for gynecological examination    - f/u affirm and Pap.  - may want to consider HSV testing. Patient reports had shingles "down there" when she was much younger. Will await other results before ordering.    Follow up in about 1 year (around 1/31/2021) for annual exam.  "

## 2020-02-04 LAB
BACTERIAL VAGINOSIS DNA: NEGATIVE
CANDIDA GLABRATA DNA: NEGATIVE
CANDIDA KRUSEI DNA: NEGATIVE
CANDIDA RRNA VAG QL PROBE: NEGATIVE
T VAGINALIS RRNA GENITAL QL PROBE: NEGATIVE

## 2020-02-05 LAB
HPV HR 12 DNA SPEC QL NAA+PROBE: NEGATIVE
HPV16 AG SPEC QL: NEGATIVE
HPV18 DNA SPEC QL NAA+PROBE: NEGATIVE

## 2020-02-06 ENCOUNTER — APPOINTMENT (OUTPATIENT)
Dept: RADIOLOGY | Facility: OTHER | Age: 40
End: 2020-02-06
Attending: OBSTETRICS & GYNECOLOGY
Payer: COMMERCIAL

## 2020-02-06 VITALS — HEIGHT: 59 IN | BODY MASS INDEX: 27.96 KG/M2 | WEIGHT: 138.69 LBS

## 2020-02-06 DIAGNOSIS — Z12.31 ENCOUNTER FOR MAMMOGRAM TO ESTABLISH BASELINE MAMMOGRAM: ICD-10-CM

## 2020-02-06 PROCEDURE — 77067 MAMMO DIGITAL SCREENING BILAT WITH TOMOSYNTHESIS_CAD: ICD-10-PCS | Mod: 26,,, | Performed by: RADIOLOGY

## 2020-02-06 PROCEDURE — 77063 MAMMO DIGITAL SCREENING BILAT WITH TOMOSYNTHESIS_CAD: ICD-10-PCS | Mod: 26,,, | Performed by: RADIOLOGY

## 2020-02-06 PROCEDURE — 77067 SCR MAMMO BI INCL CAD: CPT | Mod: 26,,, | Performed by: RADIOLOGY

## 2020-02-06 PROCEDURE — 77063 BREAST TOMOSYNTHESIS BI: CPT | Mod: 26,,, | Performed by: RADIOLOGY

## 2020-02-06 PROCEDURE — 77067 SCR MAMMO BI INCL CAD: CPT | Mod: TC,PN

## 2020-02-26 LAB
FINAL PATHOLOGIC DIAGNOSIS: NORMAL
Lab: NORMAL

## 2020-10-01 ENCOUNTER — TELEPHONE (OUTPATIENT)
Dept: OBSTETRICS AND GYNECOLOGY | Facility: CLINIC | Age: 40
End: 2020-10-01

## 2020-10-01 NOTE — TELEPHONE ENCOUNTER
Pt reports nipple discharge last night that she noticed when drying off.  States there is no way she can be pregnant.  Scheduled Monday with Dr. Tabares.

## 2020-10-05 ENCOUNTER — LAB VISIT (OUTPATIENT)
Dept: LAB | Facility: OTHER | Age: 40
End: 2020-10-05
Attending: OBSTETRICS & GYNECOLOGY
Payer: COMMERCIAL

## 2020-10-05 ENCOUNTER — OFFICE VISIT (OUTPATIENT)
Dept: OBSTETRICS AND GYNECOLOGY | Facility: CLINIC | Age: 40
End: 2020-10-05
Attending: OBSTETRICS & GYNECOLOGY
Payer: COMMERCIAL

## 2020-10-05 VITALS
BODY MASS INDEX: 28.28 KG/M2 | DIASTOLIC BLOOD PRESSURE: 80 MMHG | WEIGHT: 140.31 LBS | HEIGHT: 59 IN | SYSTOLIC BLOOD PRESSURE: 122 MMHG

## 2020-10-05 DIAGNOSIS — N64.52 BILATERAL NIPPLE DISCHARGE: ICD-10-CM

## 2020-10-05 DIAGNOSIS — N64.52 BILATERAL NIPPLE DISCHARGE: Primary | ICD-10-CM

## 2020-10-05 LAB — TSH SERPL DL<=0.005 MIU/L-ACNC: 1.37 UIU/ML (ref 0.4–4)

## 2020-10-05 PROCEDURE — 3008F PR BODY MASS INDEX (BMI) DOCUMENTED: ICD-10-PCS | Mod: CPTII,S$GLB,, | Performed by: OBSTETRICS & GYNECOLOGY

## 2020-10-05 PROCEDURE — 99999 PR PBB SHADOW E&M-EST. PATIENT-LVL III: CPT | Mod: PBBFAC,,, | Performed by: OBSTETRICS & GYNECOLOGY

## 2020-10-05 PROCEDURE — 84443 ASSAY THYROID STIM HORMONE: CPT

## 2020-10-05 PROCEDURE — 99213 OFFICE O/P EST LOW 20 MIN: CPT | Mod: S$GLB,,, | Performed by: OBSTETRICS & GYNECOLOGY

## 2020-10-05 PROCEDURE — 3008F BODY MASS INDEX DOCD: CPT | Mod: CPTII,S$GLB,, | Performed by: OBSTETRICS & GYNECOLOGY

## 2020-10-05 PROCEDURE — 99999 PR PBB SHADOW E&M-EST. PATIENT-LVL III: ICD-10-PCS | Mod: PBBFAC,,, | Performed by: OBSTETRICS & GYNECOLOGY

## 2020-10-05 PROCEDURE — 36415 COLL VENOUS BLD VENIPUNCTURE: CPT

## 2020-10-05 PROCEDURE — 84146 ASSAY OF PROLACTIN: CPT

## 2020-10-05 PROCEDURE — 99213 PR OFFICE/OUTPT VISIT, EST, LEVL III, 20-29 MIN: ICD-10-PCS | Mod: S$GLB,,, | Performed by: OBSTETRICS & GYNECOLOGY

## 2020-10-05 NOTE — PROGRESS NOTES
Subjective:       Patient ID: Enedina Samano is a 40 y.o. female.    Chief Complaint:  Breast Discharge      History of Present Illness  - patient presents with c/o a small amount of clear sticky bilateral nipple discharge that she noticed in the shower a few days ago. She denies breast lumps. She has no fever. Her mammogram was normal on 2020.    Past Medical History:   Diagnosis Date    Anxiety disorder     (Nos) started having panic attacks at end of 2012. became much worse in 2012    Depression     2012       Past Surgical History:   Procedure Laterality Date     SECTION      TUBAL LIGATION         No current outpatient medications on file.    Review of patient's allergies indicates:  No Known Allergies    GYN & OB History  Patient's last menstrual period was 2020.   Date of Last Pap: 2016    OB History    Para Term  AB Living   4 4 2 2   2   SAB TAB Ectopic Multiple Live Births         0 2      # Outcome Date GA Lbr Jose/2nd Weight Sex Delivery Anes PTL Lv   4  / 30w0d  1.12 kg (2 lb 7.5 oz) M CS-LTranv Spinal N VENKAT      Complications: Abruptio Placenta   3  14 36w0d  2.126 kg (4 lb 11 oz) M CS-Unspec   VENKAT   2 Term            1 Term                Social History     Socioeconomic History    Marital status:      Spouse name: Not on file    Number of children: Not on file    Years of education: Not on file    Highest education level: Not on file   Occupational History    Not on file   Social Needs    Financial resource strain: Not on file    Food insecurity     Worry: Not on file     Inability: Not on file    Transportation needs     Medical: Not on file     Non-medical: Not on file   Tobacco Use    Smoking status: Never Smoker    Smokeless tobacco: Never Used   Substance and Sexual Activity    Alcohol use: Yes     Comment: socially    Drug use: No    Sexual activity: Yes     Partners: Male     Birth  "control/protection: None   Lifestyle    Physical activity     Days per week: Not on file     Minutes per session: Not on file    Stress: Not on file   Relationships    Social connections     Talks on phone: Not on file     Gets together: Not on file     Attends Latter day service: Not on file     Active member of club or organization: Not on file     Attends meetings of clubs or organizations: Not on file     Relationship status: Not on file   Other Topics Concern    Not on file   Social History Narrative    Not on file       Family History   Problem Relation Age of Onset    Breast cancer Neg Hx     Colon cancer Neg Hx     Ovarian cancer Neg Hx     Diabetes Neg Hx     Hypertension Neg Hx        Review of Systems  Review of Systems   Respiratory: Negative for shortness of breath.    Cardiovascular: Negative for chest pain and palpitations.   Gastrointestinal: Negative for blood in stool, nausea and vomiting.   Genitourinary:        - see HPI   Skin: Negative for rash and wound.   Allergic/Immunologic: Negative for immunocompromised state.   Neurological: Negative for dizziness and syncope.   Hematological: Negative for adenopathy.   Psychiatric/Behavioral: Negative for behavioral problems.        Objective:     Vitals:    10/05/20 1259   BP: 122/80   Weight: 63.6 kg (140 lb 5.2 oz)   Height: 4' 11" (1.499 m)       Physical Exam:   Constitutional: She appears well-developed and well-nourished. She is cooperative. No distress.        Pulmonary/Chest: Right breast exhibits no inverted nipple, no mass, no nipple discharge, no skin change, no tenderness and no swelling. Left breast exhibits no inverted nipple, no mass, no nipple discharge, no skin change, no tenderness and no swelling. Breasts are symmetrical.                      Neurological: She is alert.          Assessment/ Plan:     Orders Placed This Encounter    Prolactin    DEIRDRE Mcconnell was seen today for breast discharge.    Diagnoses and all " orders for this visit:    Bilateral nipple discharge  -     Prolactin; Future  -     TSH; Future    - normal breast exam. Will check labs for reassurance.    Follow up for annual exam.

## 2020-10-06 LAB — PROLACTIN SERPL IA-MCNC: 13.1 NG/ML (ref 5.2–26.5)

## 2020-10-19 ENCOUNTER — OFFICE VISIT (OUTPATIENT)
Dept: URGENT CARE | Facility: CLINIC | Age: 40
End: 2020-10-19
Payer: COMMERCIAL

## 2020-10-19 VITALS
TEMPERATURE: 98 F | OXYGEN SATURATION: 98 % | BODY MASS INDEX: 27.42 KG/M2 | HEIGHT: 59 IN | SYSTOLIC BLOOD PRESSURE: 121 MMHG | DIASTOLIC BLOOD PRESSURE: 83 MMHG | WEIGHT: 136 LBS | HEART RATE: 89 BPM | RESPIRATION RATE: 18 BRPM

## 2020-10-19 DIAGNOSIS — Z20.822 CLOSE EXPOSURE TO COVID-19 VIRUS: Primary | ICD-10-CM

## 2020-10-19 DIAGNOSIS — R53.83 FATIGUE, UNSPECIFIED TYPE: ICD-10-CM

## 2020-10-19 DIAGNOSIS — J06.9 VIRAL URI WITH COUGH: ICD-10-CM

## 2020-10-19 LAB
CTP QC/QA: YES
SARS-COV-2 RDRP RESP QL NAA+PROBE: NEGATIVE

## 2020-10-19 PROCEDURE — 99214 OFFICE O/P EST MOD 30 MIN: CPT | Mod: S$GLB,,, | Performed by: PHYSICIAN ASSISTANT

## 2020-10-19 PROCEDURE — U0002 COVID-19 LAB TEST NON-CDC: HCPCS | Mod: QW,S$GLB,, | Performed by: PHYSICIAN ASSISTANT

## 2020-10-19 PROCEDURE — 99214 PR OFFICE/OUTPT VISIT, EST, LEVL IV, 30-39 MIN: ICD-10-PCS | Mod: S$GLB,,, | Performed by: PHYSICIAN ASSISTANT

## 2020-10-19 PROCEDURE — U0002: ICD-10-PCS | Mod: QW,S$GLB,, | Performed by: PHYSICIAN ASSISTANT

## 2020-10-19 NOTE — PROGRESS NOTES
"Subjective:       Patient ID: Enedina Samano is a 40 y.o. female.    Vitals:  height is 4' 11" (1.499 m) and weight is 61.7 kg (136 lb). Her temperature is 97.9 °F (36.6 °C). Her blood pressure is 121/83 and her pulse is 89. Her respiration is 18 and oxygen saturation is 98%.     Chief Complaint: URI    C/o sinus congestion, pressure, runny nose, cough.  covid postive. Denies fever, shortness of breat or trouble breathing.    URI   This is a new problem. The current episode started yesterday. The problem has been gradually worsening. There has been no fever. Associated symptoms include congestion, headaches and a sore throat. Pertinent negatives include no abdominal pain, chest pain, coughing, diarrhea, dysuria, ear pain, joint pain, joint swelling, nausea, neck pain, plugged ear sensation, rash, rhinorrhea, sinus pain, sneezing, swollen glands, vomiting or wheezing. She has tried nothing for the symptoms. The treatment provided no relief.       Constitution: Positive for chills and fatigue. Negative for sweating and fever.   HENT: Positive for congestion, sinus pressure and sore throat. Negative for ear pain, sinus pain and voice change.    Neck: Negative for neck pain and painful lymph nodes.   Cardiovascular: Negative for chest pain.   Eyes: Negative for eye redness.   Respiratory: Negative for cough, sputum production, bloody sputum, COPD, shortness of breath, stridor, wheezing and asthma.    Gastrointestinal: Negative for abdominal pain, nausea, vomiting and diarrhea.   Genitourinary: Negative for dysuria.   Musculoskeletal: Positive for muscle ache.   Skin: Negative for rash.   Allergic/Immunologic: Positive for seasonal allergies. Negative for asthma and sneezing.   Neurological: Positive for headaches.   Hematologic/Lymphatic: Negative for swollen lymph nodes.       Objective:      Physical Exam   Constitutional: She is oriented to person, place, and time.  Non-toxic appearance. She does not appear " ill. No distress.   HENT:   Head: Normocephalic and atraumatic.   Eyes: Conjunctivae are normal.   Cardiovascular: Normal rate.   Pulmonary/Chest: Effort normal.   Abdominal: Normal appearance.   Musculoskeletal: Normal range of motion.   Neurological: She is alert and oriented to person, place, and time.   Skin: Skin is not diaphoretic. Psychiatric: Her behavior is normal. Mood, judgment and thought content normal.   Nursing note and vitals reviewed.  Due to the state of emergency, this physical is limited. Pt amenable with limited physical, all concerns addressed  Results for orders placed or performed in visit on 10/19/20   POCT COVID-19 Rapid Screening   Result Value Ref Range    POC Rapid COVID Negative Negative     Acceptable Yes            Assessment:       1. Close exposure to COVID-19 virus    2. Fatigue, unspecified type    3. Viral URI with cough        Plan:       Quarantine 14 days past exposure  Fluids  Rest  Tylenol for fever  Discussed possibility of false negative      Close exposure to COVID-19 virus    Fatigue, unspecified type  -     POCT COVID-19 Rapid Screening    Viral URI with cough         Labs reviewed, pertinent imaging reviewed, previous medical records, medical history, surgical history, social history, family history reviewed.    Patient Instructions   Quarantine 14 days past exposure  Fluids  Rest  Tylenol for fever  Symptomatic treatment options listed below  If you develop shortness of breath or trouble breathing please go to the emergency department    Please arrange follow up with your primary medical clinic as soon as possible. You must understand that you've received an Urgent Care treatment only and that you may be released before all of your medical problems are known or treated. You, the patient, will arrange for follow up as instructed. If your symptoms worsen or fail to improve you should go to the Emergency Room.  WE CANNOT RULE OUT ALL POSSIBLE CAUSES OF  YOUR SYMPTOMS IN THE URGENT CARE SETTING PLEASE GO TO THE ER IF YOU FEELS YOUR CONDITION IS WORSENING OR YOU WOULD LIKE EMERGENT EVALUATION.    Please drink plenty of fluids.  Please get plenty of rest.  Please return here or go to the Emergency Department for any concerns or worsening of condition.  If you were given wait & see antibiotics, please wait 3-5 days before taking them, and only take them if your symptoms have worsened or not improved.  If you do begin taking the antibiotics, please take them to completion.  If you were prescribed antibiotics, please take them to completion.  If you were prescribed a narcotic medication, do not drive or operate heavy equipment or machinery while taking these medications.  If you do not have Hypertension or any history of palpitations, it is ok to take over the counter Sudafed or Mucinex D or Allegra-D or Claritin-D or Zyrtec-D.  If you do take one of the above, it is ok to combine that with plain over the counter Mucinex or Allegra or Claritin or Zyrtec.  If for example you are taking Zyrtec -D, you can combine that with Mucinex, but not Mucinex-D.  If you are taking Mucinex-D, you can combine that with plain Allegra or Claritin or Zyrtec.   If you do have Hypertension or palpitations, it is safe to take Coricidin HBP for relief of sinus symptoms.  We recommend you take over the counter Flonase (Fluticasone) or another nasally inhaled steroid unless you are already taking one.  Nasal irrigation with a saline spray or Netti Pot like device per their directions is also recommended.  If not allergic, please take over the counter Tylenol (Acetaminophen) and/or Motrin (Ibuprofen) as directed for control of pain and/or fever.  Please follow up with your primary care doctor or specialist as needed.    If you  smoke, please stop smoking.

## 2020-10-19 NOTE — PATIENT INSTRUCTIONS
Quarantine 14 days past exposure  Fluids  Rest  Tylenol for fever  Symptomatic treatment options listed below  If you develop shortness of breath or trouble breathing please go to the emergency department    Please arrange follow up with your primary medical clinic as soon as possible. You must understand that you've received an Urgent Care treatment only and that you may be released before all of your medical problems are known or treated. You, the patient, will arrange for follow up as instructed. If your symptoms worsen or fail to improve you should go to the Emergency Room.  WE CANNOT RULE OUT ALL POSSIBLE CAUSES OF YOUR SYMPTOMS IN THE URGENT CARE SETTING PLEASE GO TO THE ER IF YOU FEELS YOUR CONDITION IS WORSENING OR YOU WOULD LIKE EMERGENT EVALUATION.    Please drink plenty of fluids.  Please get plenty of rest.  Please return here or go to the Emergency Department for any concerns or worsening of condition.  If you were given wait & see antibiotics, please wait 3-5 days before taking them, and only take them if your symptoms have worsened or not improved.  If you do begin taking the antibiotics, please take them to completion.  If you were prescribed antibiotics, please take them to completion.  If you were prescribed a narcotic medication, do not drive or operate heavy equipment or machinery while taking these medications.  If you do not have Hypertension or any history of palpitations, it is ok to take over the counter Sudafed or Mucinex D or Allegra-D or Claritin-D or Zyrtec-D.  If you do take one of the above, it is ok to combine that with plain over the counter Mucinex or Allegra or Claritin or Zyrtec.  If for example you are taking Zyrtec -D, you can combine that with Mucinex, but not Mucinex-D.  If you are taking Mucinex-D, you can combine that with plain Allegra or Claritin or Zyrtec.   If you do have Hypertension or palpitations, it is safe to take Coricidin HBP for relief of sinus symptoms.  We  recommend you take over the counter Flonase (Fluticasone) or another nasally inhaled steroid unless you are already taking one.  Nasal irrigation with a saline spray or Netti Pot like device per their directions is also recommended.  If not allergic, please take over the counter Tylenol (Acetaminophen) and/or Motrin (Ibuprofen) as directed for control of pain and/or fever.  Please follow up with your primary care doctor or specialist as needed.    If you  smoke, please stop smoking.

## 2021-04-15 ENCOUNTER — PATIENT MESSAGE (OUTPATIENT)
Dept: RESEARCH | Facility: HOSPITAL | Age: 41
End: 2021-04-15

## 2021-06-29 ENCOUNTER — OFFICE VISIT (OUTPATIENT)
Dept: URGENT CARE | Facility: CLINIC | Age: 41
End: 2021-06-29
Payer: COMMERCIAL

## 2021-06-29 VITALS
HEART RATE: 73 BPM | BODY MASS INDEX: 27.62 KG/M2 | HEIGHT: 59 IN | SYSTOLIC BLOOD PRESSURE: 127 MMHG | OXYGEN SATURATION: 98 % | WEIGHT: 137 LBS | RESPIRATION RATE: 16 BRPM | DIASTOLIC BLOOD PRESSURE: 86 MMHG | TEMPERATURE: 98 F

## 2021-06-29 DIAGNOSIS — R05.9 COUGH: Primary | ICD-10-CM

## 2021-06-29 DIAGNOSIS — J01.40 ACUTE PANSINUSITIS, RECURRENCE NOT SPECIFIED: ICD-10-CM

## 2021-06-29 DIAGNOSIS — J98.01 ACUTE BRONCHOSPASM: ICD-10-CM

## 2021-06-29 DIAGNOSIS — J20.9 ACUTE PURULENT BRONCHITIS: ICD-10-CM

## 2021-06-29 LAB
CTP QC/QA: YES
SARS-COV-2 RDRP RESP QL NAA+PROBE: NEGATIVE

## 2021-06-29 PROCEDURE — U0002 COVID-19 LAB TEST NON-CDC: HCPCS | Mod: QW,S$GLB,, | Performed by: INTERNAL MEDICINE

## 2021-06-29 PROCEDURE — 99214 PR OFFICE/OUTPT VISIT, EST, LEVL IV, 30-39 MIN: ICD-10-PCS | Mod: 25,S$GLB,, | Performed by: INTERNAL MEDICINE

## 2021-06-29 PROCEDURE — 3008F PR BODY MASS INDEX (BMI) DOCUMENTED: ICD-10-PCS | Mod: CPTII,S$GLB,, | Performed by: INTERNAL MEDICINE

## 2021-06-29 PROCEDURE — 96372 THER/PROPH/DIAG INJ SC/IM: CPT | Mod: S$GLB,,, | Performed by: INTERNAL MEDICINE

## 2021-06-29 PROCEDURE — 99214 OFFICE O/P EST MOD 30 MIN: CPT | Mod: 25,S$GLB,, | Performed by: INTERNAL MEDICINE

## 2021-06-29 PROCEDURE — U0002: ICD-10-PCS | Mod: QW,S$GLB,, | Performed by: INTERNAL MEDICINE

## 2021-06-29 PROCEDURE — 96372 PR INJECTION,THERAP/PROPH/DIAG2ST, IM OR SUBCUT: ICD-10-PCS | Mod: S$GLB,,, | Performed by: INTERNAL MEDICINE

## 2021-06-29 PROCEDURE — 3008F BODY MASS INDEX DOCD: CPT | Mod: CPTII,S$GLB,, | Performed by: INTERNAL MEDICINE

## 2021-06-29 RX ORDER — DEXAMETHASONE SODIUM PHOSPHATE 100 MG/10ML
10 INJECTION INTRAMUSCULAR; INTRAVENOUS
Status: COMPLETED | OUTPATIENT
Start: 2021-06-29 | End: 2021-06-29

## 2021-06-29 RX ADMIN — DEXAMETHASONE SODIUM PHOSPHATE 10 MG: 100 INJECTION INTRAMUSCULAR; INTRAVENOUS at 09:06

## 2023-03-01 ENCOUNTER — PATIENT MESSAGE (OUTPATIENT)
Dept: OBSTETRICS AND GYNECOLOGY | Facility: CLINIC | Age: 43
End: 2023-03-01
Payer: COMMERCIAL

## 2023-03-02 ENCOUNTER — OFFICE VISIT (OUTPATIENT)
Dept: OBSTETRICS AND GYNECOLOGY | Facility: CLINIC | Age: 43
End: 2023-03-02
Payer: COMMERCIAL

## 2023-03-02 ENCOUNTER — PATIENT MESSAGE (OUTPATIENT)
Dept: OBSTETRICS AND GYNECOLOGY | Facility: CLINIC | Age: 43
End: 2023-03-02

## 2023-03-02 DIAGNOSIS — F41.8 SITUATIONAL ANXIETY: Primary | ICD-10-CM

## 2023-03-02 PROCEDURE — 99212 OFFICE O/P EST SF 10 MIN: CPT | Mod: 95,,, | Performed by: OBSTETRICS & GYNECOLOGY

## 2023-03-02 PROCEDURE — 99212 PR OFFICE/OUTPT VISIT, EST, LEVL II, 10-19 MIN: ICD-10-PCS | Mod: 95,,, | Performed by: OBSTETRICS & GYNECOLOGY

## 2023-03-02 RX ORDER — CITALOPRAM 20 MG/1
20 TABLET, FILM COATED ORAL DAILY
Qty: 90 TABLET | Refills: 0 | Status: SHIPPED | OUTPATIENT
Start: 2023-03-02 | End: 2023-05-29

## 2023-03-02 RX ORDER — DIAZEPAM 5 MG/1
5 TABLET ORAL EVERY 6 HOURS PRN
Qty: 20 TABLET | Refills: 0 | Status: SHIPPED | OUTPATIENT
Start: 2023-03-02 | End: 2023-04-01

## 2023-03-02 NOTE — PROGRESS NOTES
The patient location is: home  The chief complaint leading to consultation is: situational anxiety    Visit type: audiovisual    Face to Face time with patient: 5  10 minutes of total time spent on the encounter, which includes face to face time and non-face to face time preparing to see the patient (eg, review of tests), Obtaining and/or reviewing separately obtained history, Documenting clinical information in the electronic or other health record, Independently interpreting results (not separately reported) and communicating results to the patient/family/caregiver, or Care coordination (not separately reported).         Each patient to whom he or she provides medical services by telemedicine is:  (1) informed of the relationship between the physician and patient and the respective role of any other health care provider with respect to management of the patient; and (2) notified that he or she may decline to receive medical services by telemedicine and may withdraw from such care at any time.    Notes:      Subjective:       Patient ID: Enedina Samano is a 42 y.o. female.    Chief Complaint:  Situational anxiety      History of Present Illness  - patient presents for virtual visit to discuss situational anxiety. Has taken citalopram in the past and has done well. Has had to take diazepam 5 mg prn since COVID. Recently moved,  is working longer hours this time of year, patient is alone with the kids for longer hours. Denies depression. No si/hi.  - she is having trouble finding a therapist.    Past Medical History:   Diagnosis Date    Anxiety disorder     (Nos) started having panic attacks at end of 2012. became much worse in 2012    Depression     2012       Past Surgical History:   Procedure Laterality Date     SECTION      TUBAL LIGATION          Family History   Problem Relation Age of Onset    Breast cancer Neg Hx     Colon cancer Neg Hx     Ovarian cancer Neg Hx      Diabetes Neg Hx     Hypertension Neg Hx         Social History     Socioeconomic History    Marital status:    Tobacco Use    Smoking status: Never    Smokeless tobacco: Never   Substance and Sexual Activity    Alcohol use: Yes     Comment: socially    Drug use: No    Sexual activity: Yes     Partners: Male     Birth control/protection: None           Objective:   There were no vitals filed for this visit.    Physical Exam:   Constitutional: She appears well-developed and well-nourished. She is cooperative. No distress.                           Neurological: She is alert.        Assessment/ Plan:     Orders Placed This Encounter    citalopram (CELEXA) 20 MG tablet    diazePAM (VALIUM) 5 MG tablet       Enedina was seen today for situational anxiety.    Diagnoses and all orders for this visit:    Situational anxiety    Other orders  -     citalopram (CELEXA) 20 MG tablet; Take 1 tablet (20 mg total) by mouth once daily.  -     diazePAM (VALIUM) 5 MG tablet; Take 1 tablet (5 mg total) by mouth every 6 (six) hours as needed for Anxiety.    - sent in Rx. She will start by taking 1/2 tab Celexa qd x 6 days then whole tab.  - sent in diazepam for her to have just while the Celexa is becoming therapeutic.  - sent portal message re: therapy groups.   - patient has annual scheduled.  - she will message me to discuss refills of Celexa.  - patient verbalized  understanding and agrees with plan.    Follow up if symptoms worsen or fail to improve.    As of April 1, 2021, the Cures Act has been passed nationally. This new law requires that all doctors progress notes, lab results, pathology reports and radiology reports be released IMMEDIATELY to the patient in the patient portal. That means that the results are released to you at the EXACT same time they are released to me. Therefore, with all of the patients that I have I am not able to reply to each patient exactly when the results come in. So there will be a delay from  when you see the results to when I see them and have time to come up with a response to send you. Also I only see these results when I am on the computer at work. So if the results come in over the weekend or after 5 pm of a work day, I will not see them until the next business day. As you can tell, this is a challenge as a physician to give every patient the quick response they hope for and deserve. So please be patient!   Thanks for your understanding and patience.

## 2023-05-29 RX ORDER — CITALOPRAM 20 MG/1
TABLET, FILM COATED ORAL
Qty: 90 TABLET | Refills: 0 | Status: SHIPPED | OUTPATIENT
Start: 2023-05-29

## 2023-10-11 ENCOUNTER — OFFICE VISIT (OUTPATIENT)
Dept: OBSTETRICS AND GYNECOLOGY | Facility: CLINIC | Age: 43
End: 2023-10-11
Attending: OBSTETRICS & GYNECOLOGY
Payer: COMMERCIAL

## 2023-10-11 VITALS
SYSTOLIC BLOOD PRESSURE: 122 MMHG | WEIGHT: 143.88 LBS | HEIGHT: 59 IN | DIASTOLIC BLOOD PRESSURE: 88 MMHG | BODY MASS INDEX: 29 KG/M2

## 2023-10-11 DIAGNOSIS — Z12.4 SCREENING FOR CERVICAL CANCER: ICD-10-CM

## 2023-10-11 DIAGNOSIS — Z12.31 ENCOUNTER FOR SCREENING MAMMOGRAM FOR BREAST CANCER: ICD-10-CM

## 2023-10-11 DIAGNOSIS — Z11.51 SCREENING FOR HPV (HUMAN PAPILLOMAVIRUS): ICD-10-CM

## 2023-10-11 DIAGNOSIS — Z01.419 ENCOUNTER FOR GYNECOLOGICAL EXAMINATION: Primary | ICD-10-CM

## 2023-10-11 PROCEDURE — 1159F MED LIST DOCD IN RCRD: CPT | Mod: CPTII,S$GLB,, | Performed by: OBSTETRICS & GYNECOLOGY

## 2023-10-11 PROCEDURE — 3079F DIAST BP 80-89 MM HG: CPT | Mod: CPTII,S$GLB,, | Performed by: OBSTETRICS & GYNECOLOGY

## 2023-10-11 PROCEDURE — 3074F PR MOST RECENT SYSTOLIC BLOOD PRESSURE < 130 MM HG: ICD-10-PCS | Mod: CPTII,S$GLB,, | Performed by: OBSTETRICS & GYNECOLOGY

## 2023-10-11 PROCEDURE — 3008F BODY MASS INDEX DOCD: CPT | Mod: CPTII,S$GLB,, | Performed by: OBSTETRICS & GYNECOLOGY

## 2023-10-11 PROCEDURE — 1160F PR REVIEW ALL MEDS BY PRESCRIBER/CLIN PHARMACIST DOCUMENTED: ICD-10-PCS | Mod: CPTII,S$GLB,, | Performed by: OBSTETRICS & GYNECOLOGY

## 2023-10-11 PROCEDURE — 99999 PR PBB SHADOW E&M-EST. PATIENT-LVL III: ICD-10-PCS | Mod: PBBFAC,,, | Performed by: OBSTETRICS & GYNECOLOGY

## 2023-10-11 PROCEDURE — 3074F SYST BP LT 130 MM HG: CPT | Mod: CPTII,S$GLB,, | Performed by: OBSTETRICS & GYNECOLOGY

## 2023-10-11 PROCEDURE — 1160F RVW MEDS BY RX/DR IN RCRD: CPT | Mod: CPTII,S$GLB,, | Performed by: OBSTETRICS & GYNECOLOGY

## 2023-10-11 PROCEDURE — 87624 HPV HI-RISK TYP POOLED RSLT: CPT | Performed by: OBSTETRICS & GYNECOLOGY

## 2023-10-11 PROCEDURE — 3079F PR MOST RECENT DIASTOLIC BLOOD PRESSURE 80-89 MM HG: ICD-10-PCS | Mod: CPTII,S$GLB,, | Performed by: OBSTETRICS & GYNECOLOGY

## 2023-10-11 PROCEDURE — 99396 PREV VISIT EST AGE 40-64: CPT | Mod: S$GLB,,, | Performed by: OBSTETRICS & GYNECOLOGY

## 2023-10-11 PROCEDURE — 99999 PR PBB SHADOW E&M-EST. PATIENT-LVL III: CPT | Mod: PBBFAC,,, | Performed by: OBSTETRICS & GYNECOLOGY

## 2023-10-11 PROCEDURE — 88175 CYTOPATH C/V AUTO FLUID REDO: CPT | Performed by: OBSTETRICS & GYNECOLOGY

## 2023-10-11 PROCEDURE — 1159F PR MEDICATION LIST DOCUMENTED IN MEDICAL RECORD: ICD-10-PCS | Mod: CPTII,S$GLB,, | Performed by: OBSTETRICS & GYNECOLOGY

## 2023-10-11 PROCEDURE — 99396 PR PREVENTIVE VISIT,EST,40-64: ICD-10-PCS | Mod: S$GLB,,, | Performed by: OBSTETRICS & GYNECOLOGY

## 2023-10-11 PROCEDURE — 3008F PR BODY MASS INDEX (BMI) DOCUMENTED: ICD-10-PCS | Mod: CPTII,S$GLB,, | Performed by: OBSTETRICS & GYNECOLOGY

## 2023-10-11 RX ORDER — NORETHINDRONE ACETATE AND ETHINYL ESTRADIOL, ETHINYL ESTRADIOL AND FERROUS FUMARATE 1MG-10(24)
1 KIT ORAL DAILY
Qty: 84 TABLET | Refills: 3 | Status: SHIPPED | OUTPATIENT
Start: 2023-10-11 | End: 2024-10-10

## 2023-10-11 NOTE — PROGRESS NOTES
"Subjective:       Patient ID: Enedina Samano is a 43 y.o. female.    Chief Complaint:  Annual Exam (Last pap/hpv: 2020 Normal; Last mm2020 Birads: 1 )      History of Present Illness  - here for annual. Periods used to be very regular and short. In last year, have become heavier, crampier. Has mood changes/emotional/PMS the week before period.     Past Medical History:   Diagnosis Date    Anxiety disorder     (Nos) started having panic attacks at end of 2012. became much worse in 2012    Depression     2012       Past Surgical History:   Procedure Laterality Date     SECTION      TUBAL LIGATION          Family History   Problem Relation Age of Onset    Breast cancer Neg Hx     Colon cancer Neg Hx     Ovarian cancer Neg Hx     Diabetes Neg Hx     Hypertension Neg Hx         Social History     Socioeconomic History    Marital status:    Tobacco Use    Smoking status: Never    Smokeless tobacco: Never   Substance and Sexual Activity    Alcohol use: Yes     Comment: socially    Drug use: No    Sexual activity: Yes     Partners: Male     Birth control/protection: None           Objective:     Vitals:    10/11/23 1009   BP: 122/88   Weight: 65.2 kg (143 lb 13.6 oz)   Height: 4' 11" (1.499 m)       Physical Exam:   Constitutional: She is oriented to person, place, and time. She appears well-developed and well-nourished.        Pulmonary/Chest: Right breast exhibits no mass, no nipple discharge, no skin change, no tenderness and no swelling. Left breast exhibits no mass, no nipple discharge, no skin change, no tenderness and no swelling. Breasts are symmetrical.        Abdominal: Soft. She exhibits no distension. There is no abdominal tenderness.     Genitourinary:    Vagina and uterus normal.   There is no tenderness or lesion on the right labia. There is no tenderness or lesion on the left labia. Cervix is normal. Right adnexum displays no mass, no tenderness and no fullness. " Left adnexum displays no mass, no tenderness and no fullness. No  no vaginal discharge in the vagina.    pap smear completed          Musculoskeletal: Moves all extremeties.       Neurological: She is alert and oriented to person, place, and time.     Psychiatric: She has a normal mood and affect.      Assessment/ Plan:     Orders Placed This Encounter    HPV High Risk Genotypes, PCR    Mammo Digital Screening Bilat w/ Jeremiah    Liquid-Based Pap Smear, Screening       Enedina was seen today for annual exam.    Diagnoses and all orders for this visit:    Encounter for gynecological examination    Screening for HPV (human papillomavirus)  -     HPV High Risk Genotypes, PCR    Screening for cervical cancer  -     Liquid-Based Pap Smear, Screening    Encounter for screening mammogram for breast cancer  -     Mammo Digital Screening Bilat w/ Jeremiah; Future    - discussed risks/benefits of LoLoestrin Fe for cycle control. Gave coupon. She's aware it can take up to 3 months to see how effective the pill will be.    Follow up in about 1 year (around 10/11/2024) for annual exam.    As of April 1, 2021, the Cures Act has been passed nationally. This new law requires that all doctors progress notes, lab results, pathology reports and radiology reports be released IMMEDIATELY to the patient in the patient portal. That means that the results are released to you at the EXACT same time they are released to me. Therefore, with all of the patients that I have I am not able to reply to each patient exactly when the results come in. So there will be a delay from when you see the results to when I see them and have time to come up with a response to send you. Also I only see these results when I am on the computer at work. So if the results come in over the weekend or after 5 pm of a work day, I will not see them until the next business day. As you can tell, this is a challenge as a physician to give every patient the quick response they  hope for and deserve. So please be patient!   Thanks for your understanding and patience.

## 2023-10-15 LAB
FINAL PATHOLOGIC DIAGNOSIS: NORMAL
Lab: NORMAL

## 2023-11-06 ENCOUNTER — HOSPITAL ENCOUNTER (OUTPATIENT)
Dept: RADIOLOGY | Facility: HOSPITAL | Age: 43
Discharge: HOME | End: 2023-11-06
Attending: OBSTETRICS & GYNECOLOGY
Payer: COMMERCIAL

## 2023-11-06 VITALS — WEIGHT: 143 LBS | BODY MASS INDEX: 28.83 KG/M2 | HEIGHT: 59 IN

## 2023-11-06 DIAGNOSIS — Z12.31 ENCOUNTER FOR SCREENING MAMMOGRAM FOR BREAST CANCER: ICD-10-CM

## 2023-11-06 PROCEDURE — 77067 SCR MAMMO BI INCL CAD: CPT | Mod: 26,,, | Performed by: RADIOLOGY

## 2023-11-06 PROCEDURE — 77067 MAMMO DIGITAL SCREENING BILAT WITH TOMO: ICD-10-PCS | Mod: 26,,, | Performed by: RADIOLOGY

## 2023-11-06 PROCEDURE — 77063 BREAST TOMOSYNTHESIS BI: CPT | Mod: 26,,, | Performed by: RADIOLOGY

## 2023-11-06 PROCEDURE — 77063 MAMMO DIGITAL SCREENING BILAT WITH TOMO: ICD-10-PCS | Mod: 26,,, | Performed by: RADIOLOGY

## 2023-11-06 PROCEDURE — 77067 SCR MAMMO BI INCL CAD: CPT | Mod: TC
